# Patient Record
Sex: FEMALE | Race: BLACK OR AFRICAN AMERICAN | NOT HISPANIC OR LATINO | Employment: FULL TIME | URBAN - NONMETROPOLITAN AREA
[De-identification: names, ages, dates, MRNs, and addresses within clinical notes are randomized per-mention and may not be internally consistent; named-entity substitution may affect disease eponyms.]

---

## 2022-08-16 ENCOUNTER — HOSPITAL ENCOUNTER (EMERGENCY)
Facility: HOSPITAL | Age: 22
Discharge: HOME | End: 2022-08-16
Attending: STUDENT IN AN ORGANIZED HEALTH CARE EDUCATION/TRAINING PROGRAM | Admitting: STUDENT IN AN ORGANIZED HEALTH CARE EDUCATION/TRAINING PROGRAM

## 2022-08-16 ENCOUNTER — APPOINTMENT (OUTPATIENT)
Dept: CT IMAGING | Facility: HOSPITAL | Age: 22
End: 2022-08-16

## 2022-08-16 VITALS
RESPIRATION RATE: 18 BRPM | HEIGHT: 61 IN | WEIGHT: 172 LBS | BODY MASS INDEX: 32.47 KG/M2 | SYSTOLIC BLOOD PRESSURE: 111 MMHG | DIASTOLIC BLOOD PRESSURE: 64 MMHG | OXYGEN SATURATION: 96 % | TEMPERATURE: 98.7 F | HEART RATE: 84 BPM

## 2022-08-16 DIAGNOSIS — K37 APPENDICITIS, UNSPECIFIED APPENDICITIS TYPE: ICD-10-CM

## 2022-08-16 DIAGNOSIS — E83.42 HYPOMAGNESEMIA: Primary | ICD-10-CM

## 2022-08-16 DIAGNOSIS — R11.2 NAUSEA AND VOMITING, UNSPECIFIED VOMITING TYPE: ICD-10-CM

## 2022-08-16 LAB
ALBUMIN SERPL-MCNC: 4.1 G/DL (ref 3.5–5.2)
ALBUMIN/GLOB SERPL: 1.3 G/DL
ALP SERPL-CCNC: 50 U/L (ref 39–117)
ALT SERPL W P-5'-P-CCNC: 9 U/L (ref 1–33)
ANION GAP SERPL CALCULATED.3IONS-SCNC: 12 MMOL/L (ref 5–15)
AST SERPL-CCNC: 24 U/L (ref 1–32)
BACTERIA UR QL AUTO: ABNORMAL /HPF
BASOPHILS # BLD AUTO: 0.02 10*3/MM3 (ref 0–0.2)
BASOPHILS NFR BLD AUTO: 0.3 % (ref 0–1.5)
BILIRUB SERPL-MCNC: 0.5 MG/DL (ref 0–1.2)
BILIRUB UR QL STRIP: NEGATIVE
BUN SERPL-MCNC: 11 MG/DL (ref 6–20)
BUN/CREAT SERPL: 17.5 (ref 7–25)
CALCIUM SPEC-SCNC: 9 MG/DL (ref 8.6–10.5)
CHLORIDE SERPL-SCNC: 102 MMOL/L (ref 98–107)
CLARITY UR: ABNORMAL
CO2 SERPL-SCNC: 26 MMOL/L (ref 22–29)
COLOR UR: YELLOW
CREAT SERPL-MCNC: 0.63 MG/DL (ref 0.57–1)
DEPRECATED RDW RBC AUTO: 42.1 FL (ref 37–54)
EGFRCR SERPLBLD CKD-EPI 2021: 129.6 ML/MIN/1.73
EOSINOPHIL # BLD AUTO: 0.01 10*3/MM3 (ref 0–0.4)
EOSINOPHIL NFR BLD AUTO: 0.1 % (ref 0.3–6.2)
ERYTHROCYTE [DISTWIDTH] IN BLOOD BY AUTOMATED COUNT: 13.6 % (ref 12.3–15.4)
GLOBULIN UR ELPH-MCNC: 3.1 GM/DL
GLUCOSE SERPL-MCNC: 108 MG/DL (ref 65–99)
GLUCOSE UR STRIP-MCNC: NEGATIVE MG/DL
HCG INTACT+B SERPL-ACNC: <0.1 MIU/ML
HCT VFR BLD AUTO: 39.4 % (ref 34–46.6)
HGB BLD-MCNC: 12.9 G/DL (ref 12–15.9)
HGB UR QL STRIP.AUTO: NEGATIVE
HOLD SPECIMEN: NORMAL
HOLD SPECIMEN: NORMAL
HYALINE CASTS UR QL AUTO: ABNORMAL /LPF
IMM GRANULOCYTES # BLD AUTO: 0.04 10*3/MM3 (ref 0–0.05)
IMM GRANULOCYTES NFR BLD AUTO: 0.5 % (ref 0–0.5)
KETONES UR QL STRIP: ABNORMAL
LEUKOCYTE ESTERASE UR QL STRIP.AUTO: ABNORMAL
LIPASE SERPL-CCNC: 21 U/L (ref 13–60)
LYMPHOCYTES # BLD AUTO: 1.03 10*3/MM3 (ref 0.7–3.1)
LYMPHOCYTES NFR BLD AUTO: 13.4 % (ref 19.6–45.3)
MAGNESIUM SERPL-MCNC: 1.5 MG/DL (ref 1.6–2.6)
MCH RBC QN AUTO: 27.4 PG (ref 26.6–33)
MCHC RBC AUTO-ENTMCNC: 32.7 G/DL (ref 31.5–35.7)
MCV RBC AUTO: 83.7 FL (ref 79–97)
MONOCYTES # BLD AUTO: 0.37 10*3/MM3 (ref 0.1–0.9)
MONOCYTES NFR BLD AUTO: 4.8 % (ref 5–12)
NEUTROPHILS NFR BLD AUTO: 6.24 10*3/MM3 (ref 1.7–7)
NEUTROPHILS NFR BLD AUTO: 80.9 % (ref 42.7–76)
NITRITE UR QL STRIP: NEGATIVE
NRBC BLD AUTO-RTO: 0 /100 WBC (ref 0–0.2)
PH UR STRIP.AUTO: 6.5 [PH] (ref 5–9)
PLATELET # BLD AUTO: 185 10*3/MM3 (ref 140–450)
PMV BLD AUTO: 10.1 FL (ref 6–12)
POTASSIUM SERPL-SCNC: 3.6 MMOL/L (ref 3.5–5.2)
PROT SERPL-MCNC: 7.2 G/DL (ref 6–8.5)
PROT UR QL STRIP: ABNORMAL
RBC # BLD AUTO: 4.71 10*6/MM3 (ref 3.77–5.28)
RBC # UR STRIP: ABNORMAL /HPF
REF LAB TEST METHOD: ABNORMAL
SODIUM SERPL-SCNC: 140 MMOL/L (ref 136–145)
SP GR UR STRIP: 1.03 (ref 1–1.03)
SQUAMOUS #/AREA URNS HPF: ABNORMAL /HPF
UROBILINOGEN UR QL STRIP: ABNORMAL
WBC # UR STRIP: ABNORMAL /HPF
WBC NRBC COR # BLD: 7.71 10*3/MM3 (ref 3.4–10.8)
WHOLE BLOOD HOLD COAG: NORMAL
WHOLE BLOOD HOLD SPECIMEN: NORMAL

## 2022-08-16 PROCEDURE — 25010000002 ONDANSETRON PER 1 MG: Performed by: STUDENT IN AN ORGANIZED HEALTH CARE EDUCATION/TRAINING PROGRAM

## 2022-08-16 PROCEDURE — 85025 COMPLETE CBC W/AUTO DIFF WBC: CPT | Performed by: STUDENT IN AN ORGANIZED HEALTH CARE EDUCATION/TRAINING PROGRAM

## 2022-08-16 PROCEDURE — 25010000002 IOPAMIDOL 61 % SOLUTION: Performed by: STUDENT IN AN ORGANIZED HEALTH CARE EDUCATION/TRAINING PROGRAM

## 2022-08-16 PROCEDURE — 25010000002 MORPHINE PER 10 MG: Performed by: STUDENT IN AN ORGANIZED HEALTH CARE EDUCATION/TRAINING PROGRAM

## 2022-08-16 PROCEDURE — 84702 CHORIONIC GONADOTROPIN TEST: CPT | Performed by: STUDENT IN AN ORGANIZED HEALTH CARE EDUCATION/TRAINING PROGRAM

## 2022-08-16 PROCEDURE — 83735 ASSAY OF MAGNESIUM: CPT | Performed by: STUDENT IN AN ORGANIZED HEALTH CARE EDUCATION/TRAINING PROGRAM

## 2022-08-16 PROCEDURE — 25010000002 MAGNESIUM SULFATE 2 GM/50ML SOLUTION: Performed by: STUDENT IN AN ORGANIZED HEALTH CARE EDUCATION/TRAINING PROGRAM

## 2022-08-16 PROCEDURE — 74177 CT ABD & PELVIS W/CONTRAST: CPT

## 2022-08-16 PROCEDURE — 99284 EMERGENCY DEPT VISIT MOD MDM: CPT

## 2022-08-16 PROCEDURE — 96365 THER/PROPH/DIAG IV INF INIT: CPT

## 2022-08-16 PROCEDURE — 81001 URINALYSIS AUTO W/SCOPE: CPT | Performed by: STUDENT IN AN ORGANIZED HEALTH CARE EDUCATION/TRAINING PROGRAM

## 2022-08-16 PROCEDURE — 96375 TX/PRO/DX INJ NEW DRUG ADDON: CPT

## 2022-08-16 PROCEDURE — 80053 COMPREHEN METABOLIC PANEL: CPT | Performed by: STUDENT IN AN ORGANIZED HEALTH CARE EDUCATION/TRAINING PROGRAM

## 2022-08-16 PROCEDURE — 83690 ASSAY OF LIPASE: CPT | Performed by: STUDENT IN AN ORGANIZED HEALTH CARE EDUCATION/TRAINING PROGRAM

## 2022-08-16 RX ORDER — MOXIFLOXACIN HYDROCHLORIDE 400 MG/1
400 TABLET ORAL DAILY
Qty: 7 TABLET | Refills: 0 | Status: SHIPPED | OUTPATIENT
Start: 2022-08-16 | End: 2022-08-23

## 2022-08-16 RX ORDER — ONDANSETRON 4 MG/1
4 TABLET, ORALLY DISINTEGRATING ORAL 4 TIMES DAILY PRN
Qty: 12 TABLET | Refills: 0 | Status: SHIPPED | OUTPATIENT
Start: 2022-08-16 | End: 2022-12-14

## 2022-08-16 RX ORDER — LEVOFLOXACIN 750 MG/1
750 TABLET ORAL ONCE
Status: COMPLETED | OUTPATIENT
Start: 2022-08-16 | End: 2022-08-16

## 2022-08-16 RX ORDER — MAGNESIUM SULFATE HEPTAHYDRATE 40 MG/ML
2 INJECTION, SOLUTION INTRAVENOUS ONCE
Status: COMPLETED | OUTPATIENT
Start: 2022-08-16 | End: 2022-08-16

## 2022-08-16 RX ORDER — SODIUM CHLORIDE 0.9 % (FLUSH) 0.9 %
10 SYRINGE (ML) INJECTION AS NEEDED
Status: DISCONTINUED | OUTPATIENT
Start: 2022-08-16 | End: 2022-08-17 | Stop reason: HOSPADM

## 2022-08-16 RX ORDER — ONDANSETRON 2 MG/ML
4 INJECTION INTRAMUSCULAR; INTRAVENOUS ONCE
Status: COMPLETED | OUTPATIENT
Start: 2022-08-16 | End: 2022-08-16

## 2022-08-16 RX ADMIN — ONDANSETRON 4 MG: 2 INJECTION INTRAMUSCULAR; INTRAVENOUS at 19:48

## 2022-08-16 RX ADMIN — MAGNESIUM SULFATE HEPTAHYDRATE 2 G: 40 INJECTION, SOLUTION INTRAVENOUS at 20:38

## 2022-08-16 RX ADMIN — SODIUM CHLORIDE, POTASSIUM CHLORIDE, SODIUM LACTATE AND CALCIUM CHLORIDE 1000 ML: 600; 310; 30; 20 INJECTION, SOLUTION INTRAVENOUS at 19:46

## 2022-08-16 RX ADMIN — IOPAMIDOL 90 ML: 612 INJECTION, SOLUTION INTRAVENOUS at 20:53

## 2022-08-16 RX ADMIN — LEVOFLOXACIN 750 MG: 750 TABLET, FILM COATED ORAL at 22:19

## 2022-08-16 RX ADMIN — MORPHINE SULFATE 4 MG: 4 INJECTION INTRAVENOUS at 19:48

## 2022-08-17 ENCOUNTER — ANESTHESIA (OUTPATIENT)
Dept: PERIOP | Facility: HOSPITAL | Age: 22
End: 2022-08-17

## 2022-08-17 ENCOUNTER — OFFICE VISIT (OUTPATIENT)
Dept: SURGERY | Facility: CLINIC | Age: 22
End: 2022-08-17

## 2022-08-17 ENCOUNTER — HOSPITAL ENCOUNTER (OUTPATIENT)
Facility: HOSPITAL | Age: 22
Discharge: HOME | End: 2022-08-18
Attending: STUDENT IN AN ORGANIZED HEALTH CARE EDUCATION/TRAINING PROGRAM | Admitting: STUDENT IN AN ORGANIZED HEALTH CARE EDUCATION/TRAINING PROGRAM

## 2022-08-17 ENCOUNTER — ANESTHESIA EVENT (OUTPATIENT)
Dept: PERIOP | Facility: HOSPITAL | Age: 22
End: 2022-08-17

## 2022-08-17 VITALS
OXYGEN SATURATION: 97 % | WEIGHT: 170.2 LBS | BODY MASS INDEX: 32.13 KG/M2 | HEIGHT: 61 IN | SYSTOLIC BLOOD PRESSURE: 120 MMHG | HEART RATE: 94 BPM | TEMPERATURE: 97.1 F | DIASTOLIC BLOOD PRESSURE: 84 MMHG

## 2022-08-17 DIAGNOSIS — K35.20 ACUTE APPENDICITIS WITH GENERALIZED PERITONITIS, WITHOUT GANGRENE OR ABSCESS, UNSPECIFIED WHETHER PERFORATION PRESENT: Primary | ICD-10-CM

## 2022-08-17 DIAGNOSIS — R10.13 EPIGASTRIC PAIN: Primary | ICD-10-CM

## 2022-08-17 DIAGNOSIS — R10.13 EPIGASTRIC PAIN: ICD-10-CM

## 2022-08-17 PROBLEM — K37 APPENDICITIS: Status: ACTIVE | Noted: 2022-08-17

## 2022-08-17 LAB
AMPHET+METHAMPHET UR QL: NEGATIVE
AMPHETAMINES UR QL: NEGATIVE
B-HCG UR QL: NEGATIVE
BARBITURATES UR QL SCN: NEGATIVE
BENZODIAZ UR QL SCN: NEGATIVE
BUPRENORPHINE SERPL-MCNC: NEGATIVE NG/ML
CANNABINOIDS SERPL QL: POSITIVE
COCAINE UR QL: NEGATIVE
FLUAV RNA RESP QL NAA+PROBE: NOT DETECTED
FLUBV RNA RESP QL NAA+PROBE: NOT DETECTED
METHADONE UR QL SCN: NEGATIVE
OPIATES UR QL: POSITIVE
OXYCODONE UR QL SCN: NEGATIVE
PCP UR QL SCN: NEGATIVE
PROPOXYPH UR QL: NEGATIVE
SARS-COV-2 RNA RESP QL NAA+PROBE: DETECTED
TRICYCLICS UR QL SCN: NEGATIVE

## 2022-08-17 PROCEDURE — 25010000002 DEXAMETHASONE PER 1 MG: Performed by: NURSE ANESTHETIST, CERTIFIED REGISTERED

## 2022-08-17 PROCEDURE — G0378 HOSPITAL OBSERVATION PER HR: HCPCS

## 2022-08-17 PROCEDURE — 44970 LAPAROSCOPY APPENDECTOMY: CPT | Performed by: STUDENT IN AN ORGANIZED HEALTH CARE EDUCATION/TRAINING PROGRAM

## 2022-08-17 PROCEDURE — 25010000002 HYDROMORPHONE 1 MG/ML SOLUTION: Performed by: NURSE ANESTHETIST, CERTIFIED REGISTERED

## 2022-08-17 PROCEDURE — 99204 OFFICE O/P NEW MOD 45 MIN: CPT | Performed by: STUDENT IN AN ORGANIZED HEALTH CARE EDUCATION/TRAINING PROGRAM

## 2022-08-17 PROCEDURE — 87636 SARSCOV2 & INF A&B AMP PRB: CPT | Performed by: STUDENT IN AN ORGANIZED HEALTH CARE EDUCATION/TRAINING PROGRAM

## 2022-08-17 PROCEDURE — 25010000002 KETOROLAC TROMETHAMINE PER 15 MG: Performed by: STUDENT IN AN ORGANIZED HEALTH CARE EDUCATION/TRAINING PROGRAM

## 2022-08-17 PROCEDURE — 81025 URINE PREGNANCY TEST: CPT | Performed by: ANESTHESIOLOGY

## 2022-08-17 PROCEDURE — 25010000002 FENTANYL CITRATE (PF) 50 MCG/ML SOLUTION: Performed by: NURSE ANESTHETIST, CERTIFIED REGISTERED

## 2022-08-17 PROCEDURE — 25010000002 PROPOFOL 10 MG/ML EMULSION: Performed by: NURSE ANESTHETIST, CERTIFIED REGISTERED

## 2022-08-17 PROCEDURE — 80306 DRUG TEST PRSMV INSTRMNT: CPT | Performed by: ANESTHESIOLOGY

## 2022-08-17 PROCEDURE — 25010000002 NEOSTIGMINE 10 MG/10ML SOLUTION: Performed by: NURSE ANESTHETIST, CERTIFIED REGISTERED

## 2022-08-17 PROCEDURE — 25010000002 ONDANSETRON PER 1 MG: Performed by: NURSE ANESTHETIST, CERTIFIED REGISTERED

## 2022-08-17 PROCEDURE — 25010000002 MIDAZOLAM PER 1 MG: Performed by: NURSE ANESTHETIST, CERTIFIED REGISTERED

## 2022-08-17 PROCEDURE — 25010000002 SUCCINYLCHOLINE PER 20 MG: Performed by: NURSE ANESTHETIST, CERTIFIED REGISTERED

## 2022-08-17 PROCEDURE — C9803 HOPD COVID-19 SPEC COLLECT: HCPCS | Performed by: STUDENT IN AN ORGANIZED HEALTH CARE EDUCATION/TRAINING PROGRAM

## 2022-08-17 DEVICE — ENDOPATH ECHELON VASCULAR  RELOADS, WHITE, 35MM
Type: IMPLANTABLE DEVICE | Site: ABDOMEN | Status: FUNCTIONAL
Brand: ECHELON ENDOPATH

## 2022-08-17 RX ORDER — PROPOFOL 10 MG/ML
VIAL (ML) INTRAVENOUS AS NEEDED
Status: DISCONTINUED | OUTPATIENT
Start: 2022-08-17 | End: 2022-08-17 | Stop reason: SURG

## 2022-08-17 RX ORDER — DEXAMETHASONE SODIUM PHOSPHATE 4 MG/ML
INJECTION, SOLUTION INTRA-ARTICULAR; INTRALESIONAL; INTRAMUSCULAR; INTRAVENOUS; SOFT TISSUE AS NEEDED
Status: DISCONTINUED | OUTPATIENT
Start: 2022-08-17 | End: 2022-08-17 | Stop reason: SURG

## 2022-08-17 RX ORDER — MEPERIDINE HYDROCHLORIDE 25 MG/ML
12.5 INJECTION INTRAMUSCULAR; INTRAVENOUS; SUBCUTANEOUS
Status: DISCONTINUED | OUTPATIENT
Start: 2022-08-17 | End: 2022-08-17 | Stop reason: HOSPADM

## 2022-08-17 RX ORDER — NEOSTIGMINE METHYLSULFATE 1 MG/ML
INJECTION, SOLUTION INTRAVENOUS AS NEEDED
Status: DISCONTINUED | OUTPATIENT
Start: 2022-08-17 | End: 2022-08-17 | Stop reason: SURG

## 2022-08-17 RX ORDER — PROMETHAZINE HYDROCHLORIDE 25 MG/1
25 SUPPOSITORY RECTAL ONCE AS NEEDED
Status: DISCONTINUED | OUTPATIENT
Start: 2022-08-17 | End: 2022-08-17 | Stop reason: HOSPADM

## 2022-08-17 RX ORDER — BUPIVACAINE HYDROCHLORIDE 2.5 MG/ML
INJECTION, SOLUTION EPIDURAL; INFILTRATION; INTRACAUDAL AS NEEDED
Status: DISCONTINUED | OUTPATIENT
Start: 2022-08-17 | End: 2022-08-17 | Stop reason: HOSPADM

## 2022-08-17 RX ORDER — IBUPROFEN 400 MG/1
400 TABLET ORAL
Status: DISCONTINUED | OUTPATIENT
Start: 2022-08-17 | End: 2022-08-18 | Stop reason: HOSPADM

## 2022-08-17 RX ORDER — ONDANSETRON 2 MG/ML
4 INJECTION INTRAMUSCULAR; INTRAVENOUS ONCE AS NEEDED
Status: DISCONTINUED | OUTPATIENT
Start: 2022-08-17 | End: 2022-08-17 | Stop reason: HOSPADM

## 2022-08-17 RX ORDER — SUCCINYLCHOLINE CHLORIDE 20 MG/ML
INJECTION INTRAMUSCULAR; INTRAVENOUS AS NEEDED
Status: DISCONTINUED | OUTPATIENT
Start: 2022-08-17 | End: 2022-08-17 | Stop reason: SURG

## 2022-08-17 RX ORDER — ONDANSETRON 2 MG/ML
INJECTION INTRAMUSCULAR; INTRAVENOUS AS NEEDED
Status: DISCONTINUED | OUTPATIENT
Start: 2022-08-17 | End: 2022-08-17 | Stop reason: SURG

## 2022-08-17 RX ORDER — MIDAZOLAM HYDROCHLORIDE 1 MG/ML
INJECTION INTRAMUSCULAR; INTRAVENOUS AS NEEDED
Status: DISCONTINUED | OUTPATIENT
Start: 2022-08-17 | End: 2022-08-17 | Stop reason: SURG

## 2022-08-17 RX ORDER — CLINDAMYCIN PHOSPHATE 900 MG/50ML
900 INJECTION INTRAVENOUS ONCE
Status: DISCONTINUED | OUTPATIENT
Start: 2022-08-17 | End: 2022-08-17 | Stop reason: HOSPADM

## 2022-08-17 RX ORDER — HYDROCODONE BITARTRATE AND ACETAMINOPHEN 5; 325 MG/1; MG/1
1 TABLET ORAL EVERY 4 HOURS PRN
Status: DISCONTINUED | OUTPATIENT
Start: 2022-08-17 | End: 2022-08-18 | Stop reason: HOSPADM

## 2022-08-17 RX ORDER — KETOROLAC TROMETHAMINE 30 MG/ML
30 INJECTION, SOLUTION INTRAMUSCULAR; INTRAVENOUS EVERY 6 HOURS PRN
Status: DISCONTINUED | OUTPATIENT
Start: 2022-08-17 | End: 2022-08-18 | Stop reason: HOSPADM

## 2022-08-17 RX ORDER — FAMOTIDINE 10 MG/ML
20 INJECTION, SOLUTION INTRAVENOUS 2 TIMES DAILY
Status: DISCONTINUED | OUTPATIENT
Start: 2022-08-17 | End: 2022-08-18 | Stop reason: HOSPADM

## 2022-08-17 RX ORDER — SODIUM CHLORIDE, SODIUM GLUCONATE, SODIUM ACETATE, POTASSIUM CHLORIDE AND MAGNESIUM CHLORIDE 526; 502; 368; 37; 30 MG/100ML; MG/100ML; MG/100ML; MG/100ML; MG/100ML
1000 INJECTION, SOLUTION INTRAVENOUS CONTINUOUS PRN
Status: DISCONTINUED | OUTPATIENT
Start: 2022-08-17 | End: 2022-08-18 | Stop reason: HOSPADM

## 2022-08-17 RX ORDER — ONDANSETRON 2 MG/ML
4 INJECTION INTRAMUSCULAR; INTRAVENOUS EVERY 6 HOURS PRN
Status: DISCONTINUED | OUTPATIENT
Start: 2022-08-17 | End: 2022-08-18 | Stop reason: HOSPADM

## 2022-08-17 RX ORDER — ONDANSETRON 4 MG/1
4 TABLET, ORALLY DISINTEGRATING ORAL 4 TIMES DAILY PRN
Status: DISCONTINUED | OUTPATIENT
Start: 2022-08-17 | End: 2022-08-17 | Stop reason: SDUPTHER

## 2022-08-17 RX ORDER — FENTANYL CITRATE 50 UG/ML
INJECTION, SOLUTION INTRAMUSCULAR; INTRAVENOUS AS NEEDED
Status: DISCONTINUED | OUTPATIENT
Start: 2022-08-17 | End: 2022-08-17 | Stop reason: SURG

## 2022-08-17 RX ORDER — PROMETHAZINE HYDROCHLORIDE 25 MG/1
25 TABLET ORAL ONCE AS NEEDED
Status: DISCONTINUED | OUTPATIENT
Start: 2022-08-17 | End: 2022-08-17 | Stop reason: HOSPADM

## 2022-08-17 RX ORDER — SODIUM CHLORIDE 0.9 % (FLUSH) 0.9 %
10 SYRINGE (ML) INJECTION AS NEEDED
Status: DISCONTINUED | OUTPATIENT
Start: 2022-08-17 | End: 2022-08-17 | Stop reason: HOSPADM

## 2022-08-17 RX ORDER — ONDANSETRON 4 MG/1
4 TABLET, FILM COATED ORAL EVERY 6 HOURS PRN
Status: DISCONTINUED | OUTPATIENT
Start: 2022-08-17 | End: 2022-08-18 | Stop reason: HOSPADM

## 2022-08-17 RX ORDER — CYCLOBENZAPRINE HCL 5 MG
5 TABLET ORAL 3 TIMES DAILY PRN
Status: DISCONTINUED | OUTPATIENT
Start: 2022-08-17 | End: 2022-08-18 | Stop reason: HOSPADM

## 2022-08-17 RX ADMIN — SUCCINYLCHOLINE CHLORIDE 140 MG: 20 INJECTION, SOLUTION INTRAMUSCULAR; INTRAVENOUS at 18:28

## 2022-08-17 RX ADMIN — FAMOTIDINE 20 MG: 10 INJECTION INTRAVENOUS at 20:55

## 2022-08-17 RX ADMIN — MIDAZOLAM HYDROCHLORIDE 2 MG: 1 INJECTION, SOLUTION INTRAMUSCULAR; INTRAVENOUS at 18:18

## 2022-08-17 RX ADMIN — NEOSTIGMINE METHYLSULFATE 2 MG: 0.5 INJECTION INTRAVENOUS at 19:06

## 2022-08-17 RX ADMIN — HYDROMORPHONE HYDROCHLORIDE 0.5 MG: 1 INJECTION, SOLUTION INTRAMUSCULAR; INTRAVENOUS; SUBCUTANEOUS at 19:25

## 2022-08-17 RX ADMIN — SODIUM CHLORIDE, SODIUM GLUCONATE, SODIUM ACETATE, POTASSIUM CHLORIDE AND MAGNESIUM CHLORIDE 1000 ML: 526; 502; 368; 37; 30 INJECTION, SOLUTION INTRAVENOUS at 20:04

## 2022-08-17 RX ADMIN — HYDROCODONE BITARTRATE AND ACETAMINOPHEN 1 TABLET: 5; 325 TABLET ORAL at 23:54

## 2022-08-17 RX ADMIN — ONDANSETRON 4 MG: 2 INJECTION INTRAMUSCULAR; INTRAVENOUS at 18:43

## 2022-08-17 RX ADMIN — PROPOFOL 200 MG: 10 INJECTION, EMULSION INTRAVENOUS at 18:23

## 2022-08-17 RX ADMIN — HYDROMORPHONE HYDROCHLORIDE 0.5 MG: 1 INJECTION, SOLUTION INTRAMUSCULAR; INTRAVENOUS; SUBCUTANEOUS at 20:01

## 2022-08-17 RX ADMIN — SODIUM CHLORIDE, SODIUM GLUCONATE, SODIUM ACETATE, POTASSIUM CHLORIDE AND MAGNESIUM CHLORIDE 1000 ML: 526; 502; 368; 37; 30 INJECTION, SOLUTION INTRAVENOUS at 13:49

## 2022-08-17 RX ADMIN — IBUPROFEN 400 MG: 400 TABLET ORAL at 20:55

## 2022-08-17 RX ADMIN — KETOROLAC TROMETHAMINE 30 MG: 30 INJECTION, SOLUTION INTRAMUSCULAR; INTRAVENOUS at 20:55

## 2022-08-17 RX ADMIN — GLYCOPYRROLATE 0.4 MG: 0.2 INJECTION, SOLUTION INTRAMUSCULAR; INTRAVITREAL at 19:06

## 2022-08-17 RX ADMIN — DEXAMETHASONE SODIUM PHOSPHATE 4 MG: 4 INJECTION, SOLUTION INTRAMUSCULAR; INTRAVENOUS at 18:43

## 2022-08-17 RX ADMIN — FENTANYL CITRATE 100 MCG: 50 INJECTION INTRAMUSCULAR; INTRAVENOUS at 18:23

## 2022-08-17 NOTE — ED PROVIDER NOTES
"Subjective   21-year-old female comes to the ER chief complaint of several hour history of worsening abdominal pain, nausea, vomiting, diarrhea.  She endorses having bilateral flank pain.  Nothing is making her feel better or worse.  She rates the pain a 10/10.  It is burning and sharp in nature.  She has never had something like this before.  She has not taken something for the pain.      History provided by:  Patient   used: No        Review of Systems   Constitutional: Positive for activity change, appetite change and fatigue. Negative for chills and fever.   HENT: Negative for drooling.    Eyes: Negative for redness.   Respiratory: Negative for cough, chest tightness, shortness of breath and wheezing.    Cardiovascular: Negative for chest pain and palpitations.   Gastrointestinal: Positive for abdominal pain, diarrhea, nausea and vomiting. Negative for blood in stool and constipation.   Genitourinary: Positive for flank pain. Negative for difficulty urinating, dysuria, frequency, urgency, vaginal bleeding and vaginal discharge.   Skin: Negative for color change.   Neurological: Negative for seizures.   Psychiatric/Behavioral: Negative for confusion.       No past medical history on file.    Allergies   Allergen Reactions   • Penicillins Anaphylaxis and Unknown - High Severity       No past surgical history on file.    No family history on file.    Social History     Socioeconomic History   • Marital status:    Tobacco Use   • Smoking status: Never Smoker   • Smokeless tobacco: Never Used           Objective    Vitals:    08/16/22 1931 08/2000 08/16/22 2015 08/16/22 2030   BP: 99/55 107/62 106/61 103/59   Pulse: 85 74 70 72   Resp: 20 18 17 18   Temp:       TempSrc:       SpO2: 97% 95% 97% 95%   Weight: 78 kg (172 lb)      Height: 154.9 cm (61\")          Physical Exam  Vitals and nursing note reviewed.   Constitutional:       General: She is not in acute distress.     Appearance: " She is well-developed. She is obese. She is ill-appearing. She is not toxic-appearing or diaphoretic.   HENT:      Head: Normocephalic.      Right Ear: External ear normal.      Left Ear: External ear normal.      Mouth/Throat:      Mouth: Mucous membranes are moist.   Eyes:      Conjunctiva/sclera: Conjunctivae normal.   Pulmonary:      Effort: Pulmonary effort is normal. No accessory muscle usage or respiratory distress.      Breath sounds: No wheezing.   Chest:      Chest wall: No tenderness.   Abdominal:      General: Bowel sounds are normal.      Palpations: Abdomen is soft.      Tenderness: There is abdominal tenderness. There is right CVA tenderness and left CVA tenderness. There is no guarding or rebound.   Musculoskeletal:      Cervical back: No tenderness or bony tenderness.      Thoracic back: No tenderness or bony tenderness.      Lumbar back: No tenderness or bony tenderness.      Right lower leg: No edema.      Left lower leg: No edema.   Skin:     General: Skin is warm and dry.      Capillary Refill: Capillary refill takes less than 2 seconds.   Neurological:      Mental Status: She is alert and oriented to person, place, and time.   Psychiatric:         Behavior: Behavior normal.         Procedures           ED Course      Results for orders placed or performed during the hospital encounter of 08/16/22   hCG, Quantitative, Pregnancy    Specimen: Blood   Result Value Ref Range    HCG Quantitative <0.10 mIU/mL   Comprehensive Metabolic Panel    Specimen: Blood   Result Value Ref Range    Glucose 108 (H) 65 - 99 mg/dL    BUN 11 6 - 20 mg/dL    Creatinine 0.63 0.57 - 1.00 mg/dL    Sodium 140 136 - 145 mmol/L    Potassium 3.6 3.5 - 5.2 mmol/L    Chloride 102 98 - 107 mmol/L    CO2 26.0 22.0 - 29.0 mmol/L    Calcium 9.0 8.6 - 10.5 mg/dL    Total Protein 7.2 6.0 - 8.5 g/dL    Albumin 4.10 3.50 - 5.20 g/dL    ALT (SGPT) 9 1 - 33 U/L    AST (SGOT) 24 1 - 32 U/L    Alkaline Phosphatase 50 39 - 117 U/L    Total  Bilirubin 0.5 0.0 - 1.2 mg/dL    Globulin 3.1 gm/dL    A/G Ratio 1.3 g/dL    BUN/Creatinine Ratio 17.5 7.0 - 25.0    Anion Gap 12.0 5.0 - 15.0 mmol/L    eGFR 129.6 >60.0 mL/min/1.73   Lipase    Specimen: Blood   Result Value Ref Range    Lipase 21 13 - 60 U/L   Urinalysis With Microscopic If Indicated (No Culture) - Urine, Clean Catch    Specimen: Urine, Clean Catch   Result Value Ref Range    Color, UA Yellow Yellow, Straw, Dark Yellow, Yudith    Appearance, UA Cloudy (A) Clear    pH, UA 6.5 5.0 - 9.0    Specific Bostwick, UA 1.026 1.003 - 1.030    Glucose, UA Negative Negative    Ketones, UA 80 mg/dL (3+) (A) Negative    Bilirubin, UA Negative Negative    Blood, UA Negative Negative    Protein, UA Trace (A) Negative    Leuk Esterase, UA Trace (A) Negative    Nitrite, UA Negative Negative    Urobilinogen, UA 1.0 E.U./dL 0.2 - 1.0 E.U./dL   CBC Auto Differential    Specimen: Blood   Result Value Ref Range    WBC 7.71 3.40 - 10.80 10*3/mm3    RBC 4.71 3.77 - 5.28 10*6/mm3    Hemoglobin 12.9 12.0 - 15.9 g/dL    Hematocrit 39.4 34.0 - 46.6 %    MCV 83.7 79.0 - 97.0 fL    MCH 27.4 26.6 - 33.0 pg    MCHC 32.7 31.5 - 35.7 g/dL    RDW 13.6 12.3 - 15.4 %    RDW-SD 42.1 37.0 - 54.0 fl    MPV 10.1 6.0 - 12.0 fL    Platelets 185 140 - 450 10*3/mm3    Neutrophil % 80.9 (H) 42.7 - 76.0 %    Lymphocyte % 13.4 (L) 19.6 - 45.3 %    Monocyte % 4.8 (L) 5.0 - 12.0 %    Eosinophil % 0.1 (L) 0.3 - 6.2 %    Basophil % 0.3 0.0 - 1.5 %    Immature Grans % 0.5 0.0 - 0.5 %    Neutrophils, Absolute 6.24 1.70 - 7.00 10*3/mm3    Lymphocytes, Absolute 1.03 0.70 - 3.10 10*3/mm3    Monocytes, Absolute 0.37 0.10 - 0.90 10*3/mm3    Eosinophils, Absolute 0.01 0.00 - 0.40 10*3/mm3    Basophils, Absolute 0.02 0.00 - 0.20 10*3/mm3    Immature Grans, Absolute 0.04 0.00 - 0.05 10*3/mm3    nRBC 0.0 0.0 - 0.2 /100 WBC   Magnesium    Specimen: Blood   Result Value Ref Range    Magnesium 1.5 (L) 1.6 - 2.6 mg/dL   Urinalysis, Microscopic Only - Urine, Clean  Catch    Specimen: Urine, Clean Catch   Result Value Ref Range    RBC, UA None Seen None Seen /HPF    WBC, UA 13-20 (A) None Seen, 0-2, 3-5 /HPF    Bacteria, UA 3+ (A) None Seen /HPF    Squamous Epithelial Cells, UA 13-20 (A) None Seen, 0-2 /HPF    Hyaline Casts, UA None Seen None Seen /LPF    Methodology Manual Light Microscopy    Green Top (Gel)   Result Value Ref Range    Extra Tube Hold for add-ons.    Lavender Top   Result Value Ref Range    Extra Tube hold for add-on    Gold Top - SST   Result Value Ref Range    Extra Tube Hold for add-ons.    Light Blue Top   Result Value Ref Range    Extra Tube Hold for add-ons.            CT Abdomen Pelvis With Contrast   Final Result   1.  Early appendicitis possible. There is a borderline appendix   with minimal enhancement suspected. Correlate clinically.   2.  No obstruction or herniated bowel loops.      Electronically signed by:  Lencho Celis MD  8/16/2022 9:17 PM CDT   Workstation: [id number]                                          MDM  Number of Diagnoses or Management Options  Appendicitis, unspecified appendicitis type: new and requires workup  Hypomagnesemia: new and requires workup  Nausea and vomiting, unspecified vomiting type: new and requires workup  Diagnosis management comments: Vital signs are stable, afebrile.  Labs significant for 3+ ketones, glucose 108, UA is contaminated.  Magnesium is 1.5.  CT abdomen pelvis shows possible early appendicitis.  Patient received IVF bolus, pain medicine, magnesium, antibiotic.  Spoke with general surgery who recommends oral antibiotics and outpatient follow-up if the patient is able to tolerate p.o.  No episodes of vomiting in the ER with the patient.  Recommend PCP and general surgery follow-up.  Patient states understanding and is agreeable to the plan.  We will call in an antibiotic.      Final diagnoses:   Hypomagnesemia   Nausea and vomiting, unspecified vomiting type   Appendicitis, unspecified appendicitis type        ED Disposition  ED Disposition     ED Disposition   Discharge    Condition   Stable    Comment   --             [facility]  [address]   [place] [id number] [phone]  Schedule an appointment as soon as possible for a visit in 2 days  ER follow up    Dejuan Crawford MD  [address] [place] [address] [address]  [place] [id number] [phone]    Call in 1 day  ER follow up         Medication List      New Prescriptions    moxifloxacin 400 MG tablet  Commonly known as: Avelox  Take 1 tablet by mouth Daily for 7 days.     ondansetron ODT 4 MG disintegrating tablet  Commonly known as: ZOFRAN-ODT  Place 1 tablet on the tongue 4 (Four) Times a Day As Needed for Nausea or Vomiting.           Where to Get Your Medications      These medications were sent to [facility]/pharmacy #[id number] - [place], [place] - [address] [place] [address] - [phone]  - [phone] [address], [facility] [address]    Phone: [phone]   · moxifloxacin 400 MG tablet  · ondansetron ODT 4 MG disintegrating tablet          Aston Anderson MD  08/16/22 6040

## 2022-08-17 NOTE — ANESTHESIA PROCEDURE NOTES
Airway  Urgency: elective    Date/Time: 8/17/2022 6:32 PM  Airway not difficult    General Information and Staff    Patient location during procedure: OR  CRNA/CAA: Julianna Evans CRNA    Indications and Patient Condition    Preoxygenated: yes  MILS maintained throughout  Mask difficulty assessment: 1 - vent by mask    Final Airway Details  Final airway type: endotracheal airway      Successful airway: ETT  Cuffed: yes   Successful intubation technique: video laryngoscopy  Facilitating devices/methods: intubating stylet  Endotracheal tube insertion site: oral  Blade: Greene  Blade size: 3  ETT size (mm): 7.0  Cormack-Lehane Classification: grade I - full view of glottis  Placement verified by: chest auscultation   Measured from: lips  Number of attempts at approach: 1  Assessment: lips, teeth, and gum same as pre-op and atraumatic intubation

## 2022-08-17 NOTE — ANESTHESIA PREPROCEDURE EVALUATION
Anesthesia Evaluation     no history of anesthetic complications:  NPO Solid Status: > 8 hours  NPO Liquid Status: > 8 hours           Airway   Mallampati: I  TM distance: >3 FB  Neck ROM: full  No difficulty expected  Dental - normal exam     Pulmonary - normal exam    breath sounds clear to auscultation  (+) a smoker (quit 3 months ago) Former,   (-) COPD, asthma, sleep apnea    ROS comment: Snores occ  Cardiovascular - normal exam  Exercise tolerance: good (4-7 METS)    Rhythm: regular  Rate: normal    (-) hypertension, valvular problems/murmurs, dysrhythmias, angina, cardiac stents, DVT, hyperlipidemia      Neuro/Psych  (-) seizures, TIA, CVA, headaches, weakness, numbness, psychiatric history  GI/Hepatic/Renal/Endo    (+) obesity,     (-) GERD, hepatitis, liver disease, no renal disease, diabetes, no thyroid disorder    Musculoskeletal     (+) back pain,   Abdominal    Substance History   (+) alcohol use (occ), drug use (marijuana)     OB/GYN    (-)  Pregnant        Other        (-) history of cancer  ROS/Med Hx Other: Pain 6/10  Nausea without vomiting                  Anesthesia Plan    ASA 2 - emergent     general   Rapid sequence  intravenous induction     Anesthetic plan, risks, benefits, and alternatives have been provided, discussed and informed consent has been obtained with: patient.        CODE STATUS:

## 2022-08-17 NOTE — H&P (VIEW-ONLY)
Patient Care Team:  Provider, No Known as PCP - General      Subjective .     History of present illness:  22 y/o woman with abdominal pain that become much worse yesterday. She states that she has been having back pain for approximately two weeks but that her abdomen began hurting last night with some associated nausea and vomiting. She denies any fever or chills. She had a CT scan which showed possibly eraly acute appendicitis. She says her pain is now a 10/10.    Review of Systems   Constitutional: Negative.    HENT: Negative.    Eyes: Negative.    Respiratory: Negative.    Cardiovascular: Negative.    Gastrointestinal: Positive for abdominal pain.   Endocrine: Negative.    Genitourinary: Negative.    Musculoskeletal: Positive for back pain.   Skin: Negative.    Allergic/Immunologic: Negative.    Neurological: Negative.    Hematological: Negative.          History  No past medical history on file., History reviewed. No pertinent surgical history., History reviewed. No pertinent family history.,   Social History     Tobacco Use   • Smoking status: Never Smoker   • Smokeless tobacco: Never Used   Substance Use Topics   • Alcohol use: Yes     Comment: occasionally   • Drug use: Never   , Home Medications:  Prior to Admission medications    Medication Sig Start Date End Date Taking? Authorizing Provider   cyclobenzaprine (FLEXERIL) 5 MG tablet Take 1 tablet by mouth 3 (Three) Times a Day As Needed for Muscle Spasms. 8/14/22  Yes Yun Morales APRN   diclofenac (VOLTAREN) 50 MG EC tablet Take 1 tablet by mouth 2 (Two) Times a Day As Needed (back and neck pain). 8/14/22  Yes Yun Morales APRN   moxifloxacin (Avelox) 400 MG tablet Take 1 tablet by mouth Daily for 7 days. 8/16/22 8/23/22  Aston Anderson MD   ondansetron ODT (ZOFRAN-ODT) 4 MG disintegrating tablet Place 1 tablet on the tongue 4 (Four) Times a Day As Needed for Nausea or Vomiting. 8/16/22   Aston Anderson MD   , Scheduled Meds:  ,  Continuous Infusions:  No current facility-administered medications for this visit.  , PRN Meds:   and Allergies:    Allergies   Allergen Reactions   • Penicillins Anaphylaxis and Unknown - High Severity       Objective     Vital Signs   Temp:  [97.1 °F (36.2 °C)-98.7 °F (37.1 °C)] 97.1 °F (36.2 °C)  Heart Rate:  [70-94] 94  Resp:  [17-20] 18  BP: ()/(55-84) 120/84    Physical Exam:  Physical Exam  Constitutional:       Appearance: Normal appearance.   HENT:      Head: Normocephalic and atraumatic.   Abdominal:      Comments: Diffusely tender, nonperitonitic   Neurological:      General: No focal deficit present.      Mental Status: She is alert and oriented to person, place, and time.   Psychiatric:         Mood and Affect: Mood normal.         Behavior: Behavior normal.           Results from last 7 days   Lab Units 08/16/22  1939   SODIUM mmol/L 140   POTASSIUM mmol/L 3.6   CHLORIDE mmol/L 102   CO2 mmol/L 26.0   BUN mg/dL 11   CREATININE mg/dL 0.63   GLUCOSE mg/dL 108*   CALCIUM mg/dL 9.0     Results from last 7 days   Lab Units 08/16/22  1939   WBC 10*3/mm3 7.71   HEMOGLOBIN g/dL 12.9   HEMATOCRIT % 39.4   PLATELETS 10*3/mm3 185       Results Review:   I reviewed the patient's new clinical results.  I reviewed the patient's new imaging results and agree with the interpretation.      Assessment & Plan     22 y/o woman with abdominal pain of unclear eitology    - Given the level of pain Ms. Phelan has today and with her history of possible early acute appendicits, I think it is reasonable to offer her a diagnostic laparoscopy and possible appendectomy.  I have counseled the patient about the nature of the problem,  the natural history of the disease,  the risk and benefits of surgery,  the expected recovery,  and the alternatives to surgery.  After this discussion  They were given an opportunity to ask questions,  have an understanding of diagnosis and treatment options, and with to proceed with surgery.   -  Will plan lap appy today      I discussed the patient's findings and my recommendations with patient and family      This document has been electronically signed by Nica Candelario MA on August 17, 2022 10:28 CDT     Nica Candelario MA  08/17/22  10:28 CDT

## 2022-08-17 NOTE — PROGRESS NOTES
Patient Care Team:  Provider, No Known as PCP - General      Subjective .     History of present illness:  20 y/o woman with abdominal pain that become much worse yesterday. She states that she has been having back pain for approximately two weeks but that her abdomen began hurting last night with some associated nausea and vomiting. She denies any fever or chills. She had a CT scan which showed possibly eraly acute appendicitis. She says her pain is now a 10/10.    Review of Systems   Constitutional: Negative.    HENT: Negative.    Eyes: Negative.    Respiratory: Negative.    Cardiovascular: Negative.    Gastrointestinal: Positive for abdominal pain.   Endocrine: Negative.    Genitourinary: Negative.    Musculoskeletal: Positive for back pain.   Skin: Negative.    Allergic/Immunologic: Negative.    Neurological: Negative.    Hematological: Negative.          History  No past medical history on file., History reviewed. No pertinent surgical history., History reviewed. No pertinent family history.,   Social History     Tobacco Use   • Smoking status: Never Smoker   • Smokeless tobacco: Never Used   Substance Use Topics   • Alcohol use: Yes     Comment: occasionally   • Drug use: Never   , Home Medications:  Prior to Admission medications    Medication Sig Start Date End Date Taking? Authorizing Provider   cyclobenzaprine (FLEXERIL) 5 MG tablet Take 1 tablet by mouth 3 (Three) Times a Day As Needed for Muscle Spasms. 8/14/22  Yes Yun Morales APRN   diclofenac (VOLTAREN) 50 MG EC tablet Take 1 tablet by mouth 2 (Two) Times a Day As Needed (back and neck pain). 8/14/22  Yes Yun Morales APRN   moxifloxacin (Avelox) 400 MG tablet Take 1 tablet by mouth Daily for 7 days. 8/16/22 8/23/22  Aston Anderson MD   ondansetron ODT (ZOFRAN-ODT) 4 MG disintegrating tablet Place 1 tablet on the tongue 4 (Four) Times a Day As Needed for Nausea or Vomiting. 8/16/22   Aston Anderson MD   , Scheduled Meds:  ,  Continuous Infusions:  No current facility-administered medications for this visit.  , PRN Meds:   and Allergies:    Allergies   Allergen Reactions   • Penicillins Anaphylaxis and Unknown - High Severity       Objective     Vital Signs   Temp:  [97.1 °F (36.2 °C)-98.7 °F (37.1 °C)] 97.1 °F (36.2 °C)  Heart Rate:  [70-94] 94  Resp:  [17-20] 18  BP: ()/(55-84) 120/84    Physical Exam:  Physical Exam  Constitutional:       Appearance: Normal appearance.   HENT:      Head: Normocephalic and atraumatic.   Abdominal:      Comments: Diffusely tender, nonperitonitic   Neurological:      General: No focal deficit present.      Mental Status: She is alert and oriented to person, place, and time.   Psychiatric:         Mood and Affect: Mood normal.         Behavior: Behavior normal.           Results from last 7 days   Lab Units 08/16/22  1939   SODIUM mmol/L 140   POTASSIUM mmol/L 3.6   CHLORIDE mmol/L 102   CO2 mmol/L 26.0   BUN mg/dL 11   CREATININE mg/dL 0.63   GLUCOSE mg/dL 108*   CALCIUM mg/dL 9.0     Results from last 7 days   Lab Units 08/16/22  1939   WBC 10*3/mm3 7.71   HEMOGLOBIN g/dL 12.9   HEMATOCRIT % 39.4   PLATELETS 10*3/mm3 185       Results Review:   I reviewed the patient's new clinical results.  I reviewed the patient's new imaging results and agree with the interpretation.      Assessment & Plan     22 y/o woman with abdominal pain of unclear eitology    - Given the level of pain Ms. Phelan has today and with her history of possible early acute appendicits, I think it is reasonable to offer her a diagnostic laparoscopy and possible appendectomy.  I have counseled the patient about the nature of the problem,  the natural history of the disease,  the risk and benefits of surgery,  the expected recovery,  and the alternatives to surgery.  After this discussion  They were given an opportunity to ask questions,  have an understanding of diagnosis and treatment options, and with to proceed with surgery.   -  Will plan lap appy today      I discussed the patient's findings and my recommendations with patient and family      This document has been electronically signed by Nica Candelario MA on August 17, 2022 10:28 CDT     Nica Candelario MA  08/17/22  10:28 CDT

## 2022-08-18 ENCOUNTER — READMISSION MANAGEMENT (OUTPATIENT)
Dept: CALL CENTER | Facility: HOSPITAL | Age: 22
End: 2022-08-18

## 2022-08-18 VITALS
HEART RATE: 58 BPM | DIASTOLIC BLOOD PRESSURE: 57 MMHG | OXYGEN SATURATION: 98 % | BODY MASS INDEX: 32.38 KG/M2 | WEIGHT: 171.5 LBS | RESPIRATION RATE: 18 BRPM | SYSTOLIC BLOOD PRESSURE: 105 MMHG | TEMPERATURE: 98 F | HEIGHT: 61 IN

## 2022-08-18 PROBLEM — R10.13 EPIGASTRIC PAIN: Status: RESOLVED | Noted: 2022-08-17 | Resolved: 2022-08-18

## 2022-08-18 PROCEDURE — G0378 HOSPITAL OBSERVATION PER HR: HCPCS

## 2022-08-18 PROCEDURE — 99024 POSTOP FOLLOW-UP VISIT: CPT | Performed by: STUDENT IN AN ORGANIZED HEALTH CARE EDUCATION/TRAINING PROGRAM

## 2022-08-18 RX ORDER — HYDROCODONE BITARTRATE AND ACETAMINOPHEN 5; 325 MG/1; MG/1
1 TABLET ORAL EVERY 6 HOURS PRN
Qty: 12 TABLET | Refills: 0 | Status: SHIPPED | OUTPATIENT
Start: 2022-08-18 | End: 2022-12-14

## 2022-08-18 RX ADMIN — IBUPROFEN 400 MG: 400 TABLET ORAL at 05:09

## 2022-08-18 RX ADMIN — IBUPROFEN 400 MG: 400 TABLET ORAL at 00:54

## 2022-08-18 RX ADMIN — IBUPROFEN 400 MG: 400 TABLET ORAL at 08:33

## 2022-08-18 RX ADMIN — FAMOTIDINE 20 MG: 10 INJECTION INTRAVENOUS at 08:34

## 2022-08-18 NOTE — ANESTHESIA POSTPROCEDURE EVALUATION
Assumed care of patient from Leatha BRUNO RN   Patient: Chandler Phelan    Procedure Summary     Date: 08/17/22 Room / Location: [facility] OR 03 /  [facility] OR    Anesthesia Start: 1820 Anesthesia Stop: 1922    Procedure: APPENDECTOMY LAPAROSCOPIC (N/A Abdomen) Diagnosis:       Epigastric pain      (Epigastric pain [R10.13])    Surgeons: Dejuan Crawford MD Provider: Julianna Evans CRNA    Anesthesia Type: general ASA Status: 2 - Emergent          Anesthesia Type: general    Vitals  No vitals data found for the desired time range.          Post Anesthesia Care and Evaluation    Patient location during evaluation: PACU  Patient participation: complete - patient participated  Level of consciousness: awake and alert  Pain score: 0  Pain management: adequate    Airway patency: patent  Anesthetic complications: No anesthetic complications  PONV Status: none  Cardiovascular status: acceptable  Respiratory status: acceptable  Hydration status: acceptable    Comments: SPO2 95%

## 2022-08-18 NOTE — DISCHARGE SUMMARY
Discharge Summary  Date: 08/18/22  Service: General Surgery  Attending: Dejuan Crawford MD    Procedures: Laparoscopic Appendectomy  Consults: none  Admitting Diagnoses: Epigastric pain [R10.13]  Appendicitis [K37]   Discharge Diagnoses: acute appendicitis  Hospital Course: 20 y/o woman with acute appendicitis who presented to my clinic. She was taken to the operating room for appendectomy. She tolerated the procedure well. She was admitted postoperatively for monitoring overnight. She tolerated a regular diet. Pain controlled with oral medications. She was discharged in good condition with outpatient follow up    Condition at time of Discharge: good  Discharge Diet: Regular      Discharge Medications:     Your medication list      START taking these medications      Instructions Last Dose Given Next Dose Due   HYDROcodone-acetaminophen 5-325 MG per tablet  Commonly known as: NORCO      Take 1 tablet by mouth Every 6 (Six) Hours As Needed (Pain) for up to 12 doses.          CONTINUE taking these medications      Instructions Last Dose Given Next Dose Due   cyclobenzaprine 5 MG tablet  Commonly known as: FLEXERIL      Take 1 tablet by mouth 3 (Three) Times a Day As Needed for Muscle Spasms.       diclofenac 50 MG EC tablet  Commonly known as: VOLTAREN      Take 1 tablet by mouth 2 (Two) Times a Day As Needed (back and neck pain).       moxifloxacin 400 MG tablet  Commonly known as: Avelox      Take 1 tablet by mouth Daily for 7 days.       ondansetron ODT 4 MG disintegrating tablet  Commonly known as: ZOFRAN-ODT      Place 1 tablet on the tongue 4 (Four) Times a Day As Needed for Nausea or Vomiting.             Where to Get Your Medications      These medications were sent to [facility]/pharmacy #[id number] - [place], [place] - [address] [place] [address] [phone] [facility] [phone] [address] [address]    Phone: [phone]   · HYDROcodone-acetaminophen 5-325 MG per tablet         Activity Instructions      Discharge Activity      1) No driving while having significant abdominal pain and no longer taking narcotics.   2) May shower in 48 hours. Do not tub bath or soak in water for two weeks  3) Do not lift / push / pull more than 10 lbs until released by Dr. Crawford            Your Scheduled Appointments    Aug 23, 2022  3:30 PM  New Patient with SAVI Arias  [facility] - [place] ([place]) [address]  [facility] [address] [place] [id number] [phone]   Bring all previous medical records and films, along with current medications and insurance information.                This document has been electronically signed by Dejuan Crawford MD on August 18, 2022 07:37 CDT

## 2022-08-18 NOTE — OP NOTE
APPENDECTOMY LAPAROSCOPIC POSSIBLE OPEN  Procedure Note    Chandler Phelan  8/17/2022    Pre-op Diagnosis:   Epigastric pain [R10.13]    Post-op Diagnosis:     Post-Op Diagnosis Codes:     * Epigastric pain [R10.13]    Procedure/CPT® Codes:      Procedure(s):  APPENDECTOMY LAPAROSCOPIC    Surgeon(s):  Dejuan Crawford MD    Anesthesia: General    Staff:   Circulator: Randi Florez RN  Scrub Person: Roc Segundo          Estimated Blood Loss: minimal    Specimens:                ID Type Source Tests Collected by Time   A (Not marked as sent) : APPENDIX  Tissue Large Intestine, Appendix TISSUE EXAM, [facility] ([facility], [facility]) Dejuan Crawford MD 8/17/2022 1901         Drains: * No LDAs found *    Findings: Acute appendicitis    Complications: none    Findings: Acute appendicitis involving primarily the tip of the appendix    Complications: None    Indications: This is a 21 year old with epigastric abdominal pain.  CT scan of the abdomen demonstrated early acute appendicitis. She was taken to the operating room for appendectomy.     Procedure description: The patient was brought to the operating room and placed supine on the operating table. Adequate general endotracheal anesthesia was induced. She urinated directly before the operation    The abdominal area was prepped and draped in a sterile manner. Timeout was performed and all parties were in agreement.    A transverse infraumbilical incision was made and carried to the umbilical stalk. This was grasped with an thi clamp. An incision was then made in the fascia. A rakel clamp was placed on each side of the fascia. A 0 vicrylstay suture was placed bilaterally. The Andre trocar was then inserted. The abdomen was insufflated to 15 mm Hg. A 5 mm laparoscope was placed and an excellent view of the abdomen was achieved. No visceral injury had occurred.   Incisions were made suprapubically and in the left lower quadrant. 5 mm trocars were uneventfully placed  under direct vision in each location with no visceral injury. The bed was tilted in Trendelenburg and tipped slightly to the left. She tolerated the positioning and insufflation without difficulty.     The appendix was acutely inflamed. No other small bowel abnormalities could be seen.  The mesoappendix  was taken down with a Harmonic scalpel.  The base of the appendix was clearly visualized at the confluence of the tenia.  An endoscopic stapler was placed across the base and fired, dividing the appendix from the cecum and placing a row of staples 2.5 mm in height.  The appendix was placed into an Endo bag and brought out through the umbilical port.  All areas were checked for bleeding and none was seen. The staple line was complete and intact.    The bed was placed supine and trocars were removed and the abdomen allowed to flatten.  The umbilical port site was closed with 0 Vicryl on the fascia, and all skin incisions were closed with a continuous 4-0 subcuticular Vicryl suture.      The procedure was terminated.  She tolerated it well.  Sponge and needle counts were correct.  She was transferred to the PACU in satisfactory condition.       This document has been electronically signed by Dejuan Crawford MD on August 17, 2022 19:18 CDT      Date: 8/17/2022  Time: 19:18 CDT

## 2022-08-19 NOTE — OUTREACH NOTE
Prep Survey    Flowsheet Row Responses   [facility] facility patient discharged from? [facility]   Is LACE score < 7 ? Yes   Emergency Room discharge w/ pulse ox? No   Eligibility Readm Mgmt   Discharge diagnosis Laparoscopic Appendectomy, COVID positive   Does the patient have one of the following disease processes/diagnoses(primary or secondary)? Other   Does the patient have Home health ordered? No   Is there a DME ordered? No   Prep survey completed? Yes          SONNY WISE - Registered Nurse

## 2022-08-23 LAB — REF LAB TEST METHOD: NORMAL

## 2022-09-01 ENCOUNTER — OFFICE VISIT (OUTPATIENT)
Dept: SURGERY | Facility: CLINIC | Age: 22
End: 2022-09-01

## 2022-09-01 VITALS
HEART RATE: 81 BPM | BODY MASS INDEX: 32.21 KG/M2 | WEIGHT: 170.6 LBS | OXYGEN SATURATION: 99 % | TEMPERATURE: 97.6 F | HEIGHT: 61 IN

## 2022-09-01 DIAGNOSIS — K35.30 ACUTE APPENDICITIS WITH LOCALIZED PERITONITIS, WITHOUT PERFORATION, ABSCESS, OR GANGRENE: Primary | ICD-10-CM

## 2022-09-01 PROCEDURE — 99024 POSTOP FOLLOW-UP VISIT: CPT | Performed by: STUDENT IN AN ORGANIZED HEALTH CARE EDUCATION/TRAINING PROGRAM

## 2022-09-01 NOTE — PROGRESS NOTES
CHIEF COMPLAINT:   Chief Complaint   Patient presents with   • Post-op     Lap Appy 8/17/22       HPI: This patient presents for a post-operative visit after undergoing a laparoscopic appendectomy.  Patient reports no problems. Eating well without any significant nausea. Having good bowel function. No problems with constipation or diarrhea. No urinary complaints. Denies fever. Ambulating well and slowly returning to normal activities.    PATHOLOGY: acute appendicitis    PHYSICAL EXAM:    ABD: Incisions are healing well without any erythema or signs of infection.    ASSESSMENT:    There are no diagnoses linked to this encounter.    PLAN:    1.The patient will follow-up on a prn basis unless there are any problems.  2. May shower.   3. May return to normal activity without restrictions.        This document has been electronically signed by Dejuan Crawford MD on September 1, 2022 09:20 CDT

## 2022-12-13 ENCOUNTER — LAB (OUTPATIENT)
Dept: LAB | Facility: HOSPITAL | Age: 22
End: 2022-12-13

## 2022-12-13 ENCOUNTER — OFFICE VISIT (OUTPATIENT)
Dept: FAMILY MEDICINE CLINIC | Facility: CLINIC | Age: 22
End: 2022-12-13

## 2022-12-13 VITALS
HEIGHT: 61 IN | BODY MASS INDEX: 34.85 KG/M2 | OXYGEN SATURATION: 99 % | DIASTOLIC BLOOD PRESSURE: 62 MMHG | WEIGHT: 184.6 LBS | RESPIRATION RATE: 22 BRPM | HEART RATE: 88 BPM | SYSTOLIC BLOOD PRESSURE: 120 MMHG

## 2022-12-13 DIAGNOSIS — Z76.89 ENCOUNTER TO ESTABLISH CARE: Primary | ICD-10-CM

## 2022-12-13 DIAGNOSIS — R19.8 ABNORMAL BOWEL MOVEMENT: ICD-10-CM

## 2022-12-13 DIAGNOSIS — Z76.89 ENCOUNTER TO ESTABLISH CARE: ICD-10-CM

## 2022-12-13 DIAGNOSIS — Z12.4 SCREENING FOR CERVICAL CANCER: ICD-10-CM

## 2022-12-13 DIAGNOSIS — K62.5 RECTAL BLEEDING: ICD-10-CM

## 2022-12-13 DIAGNOSIS — R10.32 LEFT LOWER QUADRANT ABDOMINAL PAIN: ICD-10-CM

## 2022-12-13 LAB
ALBUMIN SERPL-MCNC: 4.6 G/DL (ref 3.5–5.2)
ALBUMIN/GLOB SERPL: 1.8 G/DL
ALP SERPL-CCNC: 57 U/L (ref 39–117)
ALT SERPL W P-5'-P-CCNC: 6 U/L (ref 1–33)
ANION GAP SERPL CALCULATED.3IONS-SCNC: 5.9 MMOL/L (ref 5–15)
AST SERPL-CCNC: 15 U/L (ref 1–32)
BASOPHILS # BLD AUTO: 0.04 10*3/MM3 (ref 0–0.2)
BASOPHILS NFR BLD AUTO: 0.7 % (ref 0–1.5)
BILIRUB SERPL-MCNC: 0.2 MG/DL (ref 0–1.2)
BUN SERPL-MCNC: 12 MG/DL (ref 6–20)
BUN/CREAT SERPL: 16.4 (ref 7–25)
CALCIUM SPEC-SCNC: 9.2 MG/DL (ref 8.6–10.5)
CHLORIDE SERPL-SCNC: 104 MMOL/L (ref 98–107)
CHOLEST SERPL-MCNC: 171 MG/DL (ref 0–200)
CO2 SERPL-SCNC: 27.1 MMOL/L (ref 22–29)
CREAT SERPL-MCNC: 0.73 MG/DL (ref 0.57–1)
DEPRECATED RDW RBC AUTO: 41.3 FL (ref 37–54)
EGFRCR SERPLBLD CKD-EPI 2021: 119.4 ML/MIN/1.73
EOSINOPHIL # BLD AUTO: 0.11 10*3/MM3 (ref 0–0.4)
EOSINOPHIL NFR BLD AUTO: 1.9 % (ref 0.3–6.2)
ERYTHROCYTE [DISTWIDTH] IN BLOOD BY AUTOMATED COUNT: 13.5 % (ref 12.3–15.4)
GLOBULIN UR ELPH-MCNC: 2.5 GM/DL
GLUCOSE SERPL-MCNC: 95 MG/DL (ref 65–99)
HBA1C MFR BLD: 5 % (ref 4.8–5.6)
HCT VFR BLD AUTO: 38.5 % (ref 34–46.6)
HCV AB SER DONR QL: NORMAL
HDLC SERPL-MCNC: 60 MG/DL (ref 40–60)
HGB BLD-MCNC: 13.2 G/DL (ref 12–15.9)
IMM GRANULOCYTES # BLD AUTO: 0.01 10*3/MM3 (ref 0–0.05)
IMM GRANULOCYTES NFR BLD AUTO: 0.2 % (ref 0–0.5)
LDLC SERPL CALC-MCNC: 104 MG/DL (ref 0–100)
LDLC/HDLC SERPL: 1.74 {RATIO}
LYMPHOCYTES # BLD AUTO: 2.06 10*3/MM3 (ref 0.7–3.1)
LYMPHOCYTES NFR BLD AUTO: 35.5 % (ref 19.6–45.3)
MCH RBC QN AUTO: 28.6 PG (ref 26.6–33)
MCHC RBC AUTO-ENTMCNC: 34.3 G/DL (ref 31.5–35.7)
MCV RBC AUTO: 83.3 FL (ref 79–97)
MONOCYTES # BLD AUTO: 0.36 10*3/MM3 (ref 0.1–0.9)
MONOCYTES NFR BLD AUTO: 6.2 % (ref 5–12)
NEUTROPHILS NFR BLD AUTO: 3.22 10*3/MM3 (ref 1.7–7)
NEUTROPHILS NFR BLD AUTO: 55.5 % (ref 42.7–76)
NRBC BLD AUTO-RTO: 0.2 /100 WBC (ref 0–0.2)
PLATELET # BLD AUTO: 378 10*3/MM3 (ref 140–450)
PMV BLD AUTO: 10.6 FL (ref 6–12)
POTASSIUM SERPL-SCNC: 4.8 MMOL/L (ref 3.5–5.2)
PROT SERPL-MCNC: 7.1 G/DL (ref 6–8.5)
RBC # BLD AUTO: 4.62 10*6/MM3 (ref 3.77–5.28)
SODIUM SERPL-SCNC: 137 MMOL/L (ref 136–145)
TRIGL SERPL-MCNC: 32 MG/DL (ref 0–150)
TSH SERPL DL<=0.05 MIU/L-ACNC: 0.81 UIU/ML (ref 0.27–4.2)
VIT B12 BLD-MCNC: 257 PG/ML (ref 211–946)
VLDLC SERPL-MCNC: 7 MG/DL (ref 5–40)
WBC NRBC COR # BLD: 5.8 10*3/MM3 (ref 3.4–10.8)

## 2022-12-13 PROCEDURE — 83036 HEMOGLOBIN GLYCOSYLATED A1C: CPT

## 2022-12-13 PROCEDURE — 85025 COMPLETE CBC W/AUTO DIFF WBC: CPT

## 2022-12-13 PROCEDURE — 99214 OFFICE O/P EST MOD 30 MIN: CPT | Performed by: NURSE PRACTITIONER

## 2022-12-13 PROCEDURE — 36415 COLL VENOUS BLD VENIPUNCTURE: CPT

## 2022-12-13 PROCEDURE — 82607 VITAMIN B-12: CPT

## 2022-12-13 PROCEDURE — 80053 COMPREHEN METABOLIC PANEL: CPT

## 2022-12-13 PROCEDURE — 84443 ASSAY THYROID STIM HORMONE: CPT

## 2022-12-13 PROCEDURE — 80061 LIPID PANEL: CPT

## 2022-12-13 PROCEDURE — 86803 HEPATITIS C AB TEST: CPT

## 2022-12-13 NOTE — PROGRESS NOTES
Chief Complaint  Establish Care    Subjective          Chandler Phelan presents to [facility] [place] to establish care. She has concerns today regarding bleeding from her rectum.  Nov 4th was the last time she had bleeding and it filled the toilet. There are times where she has bloating. Bleeding started in february of this year. She does not visualize hemorrhoids     Rectal Bleeding  This is a recurrent problem. The current episode started more than 1 month ago. The problem occurs every several days. The problem has been gradually worsening. Associated symptoms include abdominal pain. Nothing aggravates the symptoms. She has tried nothing for the symptoms. The treatment provided no relief.   Abdominal Pain  This is a recurrent problem. The current episode started more than 1 month ago. The onset quality is undetermined. The problem occurs every several days. The problem has been waxing and waning since onset. The pain is located in the LLQ and RLQ. The pain is mild. The quality of the pain is described as cramping. The pain does not radiate. Associated symptoms include constipation, diarrhea and hematochezia. Nothing relieves the symptoms.     Outpatient Medications Prior to Visit   Medication Sig Dispense Refill   • cyclobenzaprine (FLEXERIL) 5 MG tablet Take 1 tablet by mouth 3 (Three) Times a Day As Needed for Muscle Spasms. 30 tablet 0   • diclofenac (VOLTAREN) 50 MG EC tablet Take 1 tablet by mouth 2 (Two) Times a Day As Needed (back and neck pain). 30 tablet 0   • HYDROcodone-acetaminophen (NORCO) 5-325 MG per tablet Take 1 tablet by mouth Every 6 (Six) Hours As Needed (Pain) for up to 12 doses. 12 tablet 0   • ondansetron ODT (ZOFRAN-ODT) 4 MG disintegrating tablet Place 1 tablet on the tongue 4 (Four) Times a Day As Needed for Nausea or Vomiting. 12 tablet 0     No facility-administered medications prior to visit.       Review of Systems  "  Gastrointestinal: Positive for abdominal pain, constipation, diarrhea and hematochezia.         Objective   Vital Signs:   Visit Vitals  /62 (BP Location: Right arm, Patient Position: Sitting, Cuff Size: Adult)   Pulse 88   Resp 22   Ht 154.9 cm (61\")   Wt 83.7 kg (184 lb 9.6 oz)   SpO2 99%   BMI 34.88 kg/m²     Physical Exam  Vitals and nursing note reviewed.   Constitutional:       Appearance: She is well-developed.   HENT:      Head: Normocephalic and atraumatic.   Eyes:      General: Lids are normal.      Conjunctiva/sclera: Conjunctivae normal.   Neck:      Thyroid: No thyroid mass or thyromegaly.      Trachea: Trachea normal. No tracheal tenderness.   Cardiovascular:      Rate and Rhythm: Normal rate.      Pulses: Normal pulses.      Heart sounds: Normal heart sounds.   Pulmonary:      Effort: Pulmonary effort is normal. No respiratory distress.      Breath sounds: Normal breath sounds. No wheezing.   Abdominal:      General: Bowel sounds are normal. There is no distension.      Palpations: Abdomen is soft. There is no mass.      Tenderness: There is abdominal tenderness in the left lower quadrant.       Musculoskeletal:         General: Normal range of motion.      Cervical back: Normal range of motion. No edema.   Skin:     General: Skin is warm and dry.      Coloration: Skin is not pale.      Findings: No abrasion, erythema or lesion.   Neurological:      Mental Status: She is alert and oriented to person, place, and time.   Psychiatric:         Mood and Affect: Mood is not anxious. Affect is not inappropriate.         Speech: Speech normal.         Behavior: Behavior normal.         Thought Content: Thought content normal.         Judgment: Judgment normal. Judgment is not impulsive.        Result Review :                 Assessment and Plan    Diagnoses and all orders for this visit:    1. Encounter to establish care (Primary)  -     TSH; Future  -     Comprehensive Metabolic Panel; Future  -     " Hemoglobin A1c; Future  -     CBC & Differential; Future  -     Vitamin B12; Future  -     Lipid Panel; Future  -     Hepatitis C Antibody; Future    2. Screening for cervical cancer  -     TSH; Future  -     Comprehensive Metabolic Panel; Future  -     Hemoglobin A1c; Future  -     CBC & Differential; Future  -     Vitamin B12; Future  -     Lipid Panel; Future  -     Hepatitis C Antibody; Future  -     Ambulatory Referral to Obstetrics / Gynecology    3. Rectal bleeding  -     TSH; Future  -     Comprehensive Metabolic Panel; Future  -     Hemoglobin A1c; Future  -     CBC & Differential; Future  -     Vitamin B12; Future  -     Lipid Panel; Future  -     Hepatitis C Antibody; Future  -     Ambulatory Referral to Gastroenterology    4. Abnormal bowel movement  -     TSH; Future  -     Comprehensive Metabolic Panel; Future  -     Hemoglobin A1c; Future  -     CBC & Differential; Future  -     Vitamin B12; Future  -     Lipid Panel; Future  -     Hepatitis C Antibody; Future  -     Ambulatory Referral to Gastroenterology    5. Left lower quadrant abdominal pain  -     TSH; Future  -     Comprehensive Metabolic Panel; Future  -     Hemoglobin A1c; Future  -     CBC & Differential; Future  -     Vitamin B12; Future  -     Lipid Panel; Future  -     Hepatitis C Antibody; Future  -     Ambulatory Referral to Gastroenterology      Complete ordered lab work   We will call with results    Referral to GI     Please call the office if you have issues    If bleeding worsens please go to ER   Bleeding could be caused from hemorrhoids that are internal, however we will refer to gi first     Start stool softeners daily     We will continue to monitor       Follow Up   Return if symptoms worsen or fail to improve, for Next scheduled follow up.  Patient was given instructions and counseling regarding her condition or for health maintenance advice. Please see specific information pulled into the AVS if appropriate.           This  document has been electronically signed by SAVI Arias on December 14, 2022 10:47 CST

## 2022-12-16 ENCOUNTER — PATIENT ROUNDING (BHMG ONLY) (OUTPATIENT)
Dept: FAMILY MEDICINE CLINIC | Facility: CLINIC | Age: 22
End: 2022-12-16

## 2022-12-16 NOTE — PROGRESS NOTES
December 16, 2022    Hello, may I speak with Chandler Phelan?    My name is Vicki Knight     [facility] [address] [place] [address] PRIMARY CARE - [address] [address] [phone].    Before we get started may I verify your date of birth? 2000    I am calling to officially welcome you to our practice and ask about your recent visit. Is this a good time to talk? Yes    Tell me about your visit with us. What things went well?  Everything       We're always looking for ways to make our patients' experiences even better. Do you have recommendations on ways we may improve? No    Overall were you satisfied with your first visit to our practice? Yes       I appreciate you taking the time to speak with me today. Is there anything else I can do for you? No      Thank you, and have a great day.

## 2023-01-14 PROBLEM — S92.501A FRACTURE OF FOURTH TOE, RIGHT, CLOSED, INITIAL ENCOUNTER: Status: ACTIVE | Noted: 2023-01-14

## 2023-01-18 ENCOUNTER — OFFICE VISIT (OUTPATIENT)
Dept: GASTROENTEROLOGY | Facility: CLINIC | Age: 23
End: 2023-01-18
Payer: MEDICAID

## 2023-01-18 VITALS
HEIGHT: 60 IN | DIASTOLIC BLOOD PRESSURE: 72 MMHG | WEIGHT: 185.6 LBS | OXYGEN SATURATION: 98 % | HEART RATE: 68 BPM | BODY MASS INDEX: 36.44 KG/M2 | SYSTOLIC BLOOD PRESSURE: 112 MMHG

## 2023-01-18 DIAGNOSIS — R19.4 CHANGE IN BOWEL HABITS: ICD-10-CM

## 2023-01-18 DIAGNOSIS — R10.30 LOWER ABDOMINAL PAIN: ICD-10-CM

## 2023-01-18 DIAGNOSIS — K62.5 HEMORRHAGE OF ANUS AND RECTUM: Primary | ICD-10-CM

## 2023-01-18 PROCEDURE — 99204 OFFICE O/P NEW MOD 45 MIN: CPT | Performed by: PHYSICIAN ASSISTANT

## 2023-01-18 RX ORDER — POLYETHYLENE GLYCOL-3350 AND ELECTROLYTES WITH FLAVOR PACK 240; 5.84; 2.98; 6.72; 22.72 G/278.26G; G/278.26G; G/278.26G; G/278.26G; G/278.26G
4000 POWDER, FOR SOLUTION ORAL ONCE
Qty: 4000 ML | Refills: 0 | Status: SHIPPED | OUTPATIENT
Start: 2023-01-18 | End: 2023-01-18

## 2023-01-18 RX ORDER — DEXTROSE AND SODIUM CHLORIDE 5; .45 G/100ML; G/100ML
30 INJECTION, SOLUTION INTRAVENOUS CONTINUOUS PRN
Status: CANCELLED | OUTPATIENT
Start: 2023-01-24

## 2023-01-18 NOTE — PROGRESS NOTES
Chief Complaint   Patient presents with   • Rectal Bleeding     Has had problem for about 1 year   • Change in bowel habits     Referred By: Diane HOU       ENDO PROCEDURE ORDERED: EGD/COLON, change bowel, rectal bleeding, gerd    Subjective    Chandler Nicolette Phelan is a 22 y.o. female. she is being seen for consultation today at the request of SAVI Arias.    History of Present Illness    This 22-year-old female was sent for evaluation for change in bowel habits and rectal bleeding by SAVI Arias, who saw the patient with the above complaints and to establish on 12/13/2022. Laboratories at that time showed normal CMP, TSH, A1c, CBC. B12 was 257. Cholesterol panel showed an LDL of 104. Hepatitis C antibody non-reactive. The patient states she has heartburn with everything she eats. She denied nausea, vomiting, dysphagia. She states her stools used to very regular. Since she had an appendectomy under Dr. Crawford, she is having some problems with her bowel movements, she is seeing bright red blood, she thinks maybe from hemorrhoids, but she is passing blood in clots, a lot of mucous, pretty much every time she has a bowel movement. She tried using Preparation-H thinking it was hemorrhoids, but it made her itching worse. Stools are somewhat irregular, and fragmentary as well. Her weight has been increasing. She has never had a colonoscopy.     Patient had a CT abdomen and pelvis with contrast 08/16/2022, showed a possible early appendicitis, otherwise was read as normal. I did look at some of the images with the patient in the room. Laboratories on 08/17/2022: Her pathology was negative for appendix for any malignancy, etc. Urine drug screen positive for THC and opiates. HCG was negative. Urinalysis showed trace abnormality. Magnesium was low at 1.5, normal lipase, CBC. CMP showed glucose 108, otherwise normal.     The patient currently denies tobacco, alcohol or illicit substance use, was a previous  smoker. She has had an appendectomy, broken toe, bleeding problems. Family history of breast and cervical cancer. No known family history of GI tract malignancy or inflammatory bowel disease. Father age 49, mother age 52 in good health. She lists no spouse. One brother and 3 sister's, no children.     A/P: Patient with change in bowel habits following surgery. There was some questionable thickening of some of her loops of bowel. There was fluid in her small bowel loops on her CT scan. Her laboratories have been fairly unrevealing, but given her increased nausea, heartburn, and bowel habits, I have recommended EGD/colonoscopy. She is within age range for possible inflammatory bowel disease. We will attempt to do terminal ileum and do biopsies as indicated. Will plan follow-up after the above, further pending clinical course and the results of the above.     Thank you very much, Diane, for this consultation, and for allowing us to participate in the care of your patient. We will keep you informed.       The following portions of the patient's history were reviewed and updated as appropriate:   Past Medical History:   Diagnosis Date   • Back pain      Past Surgical History:   Procedure Laterality Date   • APPENDECTOMY N/A 2022    Procedure: APPENDECTOMY LAPAROSCOPIC;  Surgeon: Dejuan Crawford MD;  Location: [facility];  Service: General;  Laterality: N/A;     Family History   Problem Relation Age of Onset   • No Known Problems Mother    • No Known Problems Father    • No Known Problems Sister    • No Known Problems Sister    • No Known Problems Sister    • No Known Problems Brother      OB History    No obstetric history on file.       Allergies   Allergen Reactions   • Penicillins Anaphylaxis and Unknown - High Severity     Social History     Socioeconomic History   • Marital status:    Tobacco Use   • Smoking status: Former     Types: Cigarettes     Quit date: 2022     Years since quittin.7   •  "Smokeless tobacco: Never   Vaping Use   • Vaping Use: Never used   Substance and Sexual Activity   • Alcohol use: Not Currently   • Drug use: Yes     Types: Marijuana   • Sexual activity: Defer     Current Medications:  Prior to Admission medications    Not on File     Review of Systems  Review of Systems   Constitutional: Negative for unexpected weight change.   HENT: Negative for trouble swallowing.    Gastrointestinal: Positive for abdominal pain, anal bleeding, blood in stool and diarrhea. Negative for abdominal distention, constipation, nausea, rectal pain and vomiting.          Objective    /72 (BP Location: Right arm, Patient Position: Sitting)   Pulse 68   Ht 152.4 cm (60\")   Wt 84.2 kg (185 lb 9.6 oz)   LMP 12/19/2022 (Approximate)   SpO2 98%   BMI 36.25 kg/m²   Physical Exam  Vitals and nursing note reviewed.   Constitutional:       General: She is not in acute distress.     Appearance: She is well-developed. She is obese.      Comments: AA   HENT:      Head: Normocephalic and atraumatic.   Eyes:      Pupils: Pupils are equal, round, and reactive to light.   Cardiovascular:      Rate and Rhythm: Normal rate and regular rhythm.      Heart sounds: Normal heart sounds.   Pulmonary:      Effort: Pulmonary effort is normal.      Breath sounds: Normal breath sounds.   Abdominal:      General: Bowel sounds are normal. There is no distension or abdominal bruit.      Palpations: Abdomen is soft. Abdomen is not rigid. There is no shifting dullness or mass.      Tenderness: There is abdominal tenderness. There is no guarding or rebound.      Hernia: No hernia is present. There is no hernia in the ventral area.   Musculoskeletal:         General: Normal range of motion.      Cervical back: Normal range of motion.   Skin:     General: Skin is warm and dry.   Neurological:      Mental Status: She is alert and oriented to person, place, and time.   Psychiatric:         Behavior: Behavior normal.         " Thought Content: Thought content normal.         Judgment: Judgment normal.       Assessment & Plan      1. Hemorrhage of anus and rectum    2. Lower abdominal pain    3. Change in bowel habits    .   Diagnoses and all orders for this visit:    1. Hemorrhage of anus and rectum (Primary)  -     Case Request; Standing  -     dextrose 5 % and sodium chloride 0.45 % infusion  -     Case Request    2. Lower abdominal pain  -     Case Request; Standing  -     dextrose 5 % and sodium chloride 0.45 % infusion  -     Case Request    3. Change in bowel habits  -     Case Request; Standing  -     dextrose 5 % and sodium chloride 0.45 % infusion  -     Case Request    Other orders  -     Follow Anesthesia Guidelines / Protocol; Future  -     Obtain Informed Consent; Future  -     Obtain Informed Consent; Standing  -     POC Glucose Once; Standing        Orders placed during this encounter include:  Orders Placed This Encounter   Procedures   • Obtain Informed Consent     Standing Status:   Future     Order Specific Question:   Informed Consent Given For     Answer:   ESOPHAGOGASTRODUODENOSCOPY and colonoscopy       Medications prescribed:  No orders of the defined types were placed in this encounter.      Requested Prescriptions      No prescriptions requested or ordered in this encounter       Review and/or summary of lab tests, radiology, procedures, medications. Review and summary of old records and obtaining of history. The risks and benefits of my recommendations, as well as other treatment options were discussed with the patient today. Questions were answered.    Follow-up: Return in about 1 month (around 2/18/2023), or if symptoms worsen or fail to improve.     ESOPHAGOGASTRODUODENOSCOPY (N/A), COLONOSCOPY--look TI, bx (N/A)      This document has been electronically signed by Dane Leyva PA-C on January 20, 2023 14:10 CST      Results for orders placed or performed in visit on 12/13/22   CBC Auto Differential     Specimen: Blood   Result Value Ref Range    WBC 5.80 3.40 - 10.80 10*3/mm3    RBC 4.62 3.77 - 5.28 10*6/mm3    Hemoglobin 13.2 12.0 - 15.9 g/dL    Hematocrit 38.5 34.0 - 46.6 %    MCV 83.3 79.0 - 97.0 fL    MCH 28.6 26.6 - 33.0 pg    MCHC 34.3 31.5 - 35.7 g/dL    RDW 13.5 12.3 - 15.4 %    RDW-SD 41.3 37.0 - 54.0 fl    MPV 10.6 6.0 - 12.0 fL    Platelets 378 140 - 450 10*3/mm3    Neutrophil % 55.5 42.7 - 76.0 %    Lymphocyte % 35.5 19.6 - 45.3 %    Monocyte % 6.2 5.0 - 12.0 %    Eosinophil % 1.9 0.3 - 6.2 %    Basophil % 0.7 0.0 - 1.5 %    Immature Grans % 0.2 0.0 - 0.5 %    Neutrophils, Absolute 3.22 1.70 - 7.00 10*3/mm3    Lymphocytes, Absolute 2.06 0.70 - 3.10 10*3/mm3    Monocytes, Absolute 0.36 0.10 - 0.90 10*3/mm3    Eosinophils, Absolute 0.11 0.00 - 0.40 10*3/mm3    Basophils, Absolute 0.04 0.00 - 0.20 10*3/mm3    Immature Grans, Absolute 0.01 0.00 - 0.05 10*3/mm3    nRBC 0.2 0.0 - 0.2 /100 WBC   Hepatitis C Antibody    Specimen: Blood   Result Value Ref Range    Hepatitis C Ab Non-Reactive Non-Reactive   TSH    Specimen: Blood   Result Value Ref Range    TSH 0.811 0.270 - 4.200 uIU/mL   Hemoglobin A1c    Specimen: Blood   Result Value Ref Range    Hemoglobin A1C 5.00 4.80 - 5.60 %   Vitamin B12    Specimen: Blood   Result Value Ref Range    Vitamin B-12 257 211 - 946 pg/mL   Lipid Panel    Specimen: Blood   Result Value Ref Range    Total Cholesterol 171 0 - 200 mg/dL    Triglycerides 32 0 - 150 mg/dL    HDL Cholesterol 60 40 - 60 mg/dL    LDL Cholesterol  104 (H) 0 - 100 mg/dL    VLDL Cholesterol 7 5 - 40 mg/dL    LDL/HDL Ratio 1.74    Comprehensive Metabolic Panel    Specimen: Blood   Result Value Ref Range    Glucose 95 65 - 99 mg/dL    BUN 12 6 - 20 mg/dL    Creatinine 0.73 0.57 - 1.00 mg/dL    Sodium 137 136 - 145 mmol/L    Potassium 4.8 3.5 - 5.2 mmol/L    Chloride 104 98 - 107 mmol/L    CO2 27.1 22.0 - 29.0 mmol/L    Calcium 9.2 8.6 - 10.5 mg/dL    Total Protein 7.1 6.0 - 8.5 g/dL    Albumin 4.60 3.50  - 5.20 g/dL    ALT (SGPT) 6 1 - 33 U/L    AST (SGOT) 15 1 - 32 U/L    Alkaline Phosphatase 57 39 - 117 U/L    Total Bilirubin 0.2 0.0 - 1.2 mg/dL    Globulin 2.5 gm/dL    A/G Ratio 1.8 g/dL    BUN/Creatinine Ratio 16.4 7.0 - 25.0    Anion Gap 5.9 5.0 - 15.0 mmol/L    eGFR 119.4 >60.0 mL/min/1.73   Results for orders placed or performed during the hospital encounter of 22   COVID-19 and FLU A/B PCR - Swab, Nasopharynx    Specimen: Nasopharynx; Swab   Result Value Ref Range    COVID19 Detected (C) Not Detected - Ref. Range    Influenza A PCR Not Detected Not Detected    Influenza B PCR Not Detected Not Detected   TISSUE EXAM, [facility] ([facility])    Specimen: Large Intestine, Appendix; Tissue   Result Value Ref Range    Reference Lab Report       [facility]  [address] [place] [address]  Phone: [phone] or [phone]  Fax: [phone]  Charles Ramos M.D., Medical Director    PATIENT NAME                           LABORATORY NO.  LESA SALCEDO.                  [id number]  [id number]                         AGE              SEX  N           CLIENT REF #  [facility]           21      2000  F    [id number]   [id number]    [place]                       REQUESTING M.D.     ATTENDING M.D.     COPY TO.  [address] [place] [address]             DATE COLLECTED      DATE RECEIVED      DATE REPORTED  2022    DIAGNOSIS:  APPENDIX:  Acute appendicitis    JBS/sdl    CLINICAL HISTORY:  Epigastric pain    SPECIMENS RECEIVED:  APPENDIX    MICROSCOPIC DESCRIPTION:  Tissue blocks are prepared and slides are examined microscopically on all  specimens. See diagnosis for  details.    Professional interpretation rendered by Cornelio Cage M.D. at [facility],  [facility], [address] [place] [address].    GROSS DESCRIPTION:  Received in formalin  "labeled with \"large intestine, appendix\" is a 6.4 cm in  length, 4.0 cm in diameter vermiform appendix with an attached yellow  lobulated, intact, up to 1.4 cm mesoappendix.  The serosal surface is tan-red,  focally hemorrhagic, smooth and glistening with a moderate amount of tan-  yellow exudate located on the entire length of the appendix.  The staple proximal  margin is removed and inked blue.  The specimen is serially sectioned to reveal  a 0.4 to 0.6 cm lumen with an up to 0.3 cm wall thickness.  A fecalith is not  appreciated.  No masses or lesions are identified.  Representative sections are  submitted in A1.  [facility]    Section code:  A1: Proximal margin, en face, half distal tip, body    REVIEWED, DIAGNOSED AND ELECTRONICALLY  SIGNED BY:    Cornelio Cage M.D.  CPT CODES:  93437     Urine Drug Screen - Urine, Clean Catch    Specimen: Urine, Clean Catch   Result Value Ref Range    THC, Screen, Urine Positive (A) Negative    Phencyclidine (PCP), Urine Negative Negative    Cocaine Screen, Urine Negative Negative    Methamphetamine, Ur Negative Negative    Opiate Screen Positive (A) Negative    Amphetamine Screen, Urine Negative Negative    Benzodiazepine Screen, Urine Negative Negative    Tricyclic Antidepressants Screen Negative Negative    Methadone Screen, Urine Negative Negative    Barbiturates Screen, Urine Negative Negative    Oxycodone Screen, Urine Negative Negative    Propoxyphene Screen Negative Negative    Buprenorphine, Screen, Urine Negative Negative   Pregnancy, Urine - Urine, Clean Catch    Specimen: Urine, Clean Catch   Result Value Ref Range    HCG, Urine QL Negative Negative   Results for orders placed or performed during the hospital encounter of 08/16/22   Gold Top - SST   Result Value Ref Range    Extra Tube Hold for add-ons.    Green Top (Gel)   Result Value Ref Range    Extra Tube Hold for add-ons.    Urinalysis, Microscopic Only - Urine, Clean Catch    Specimen: Urine, Clean Catch "   Result Value Ref Range    RBC, UA None Seen None Seen /HPF    WBC, UA 13-20 (A) None Seen, 0-2, 3-5 /HPF    Bacteria, UA 3+ (A) None Seen /HPF    Squamous Epithelial Cells, UA 13-20 (A) None Seen, 0-2 /HPF    Hyaline Casts, UA None Seen None Seen /LPF    Methodology Manual Light Microscopy    Urinalysis With Microscopic If Indicated (No Culture) - Urine, Clean Catch    Specimen: Urine, Clean Catch   Result Value Ref Range    Color, UA Yellow Yellow, Straw, Dark Yellow, Yudith    Appearance, UA Cloudy (A) Clear    pH, UA 6.5 5.0 - 9.0    Specific [place], UA 1.026 1.003 - 1.030    Glucose, UA Negative Negative    Ketones, UA 80 mg/dL (3+) (A) Negative    Bilirubin, UA Negative Negative    Blood, UA Negative Negative    Protein, UA Trace (A) Negative    Leuk Esterase, UA Trace (A) Negative    Nitrite, UA Negative Negative    Urobilinogen, UA 1.0 E.U./dL 0.2 - 1.0 E.U./dL   CBC Auto Differential    Specimen: Blood   Result Value Ref Range    WBC 7.71 3.40 - 10.80 10*3/mm3    RBC 4.71 3.77 - 5.28 10*6/mm3    Hemoglobin 12.9 12.0 - 15.9 g/dL    Hematocrit 39.4 34.0 - 46.6 %    MCV 83.7 79.0 - 97.0 fL    MCH 27.4 26.6 - 33.0 pg    MCHC 32.7 31.5 - 35.7 g/dL    RDW 13.6 12.3 - 15.4 %    RDW-SD 42.1 37.0 - 54.0 fl    MPV 10.1 6.0 - 12.0 fL    Platelets 185 140 - 450 10*3/mm3    Neutrophil % 80.9 (H) 42.7 - 76.0 %    Lymphocyte % 13.4 (L) 19.6 - 45.3 %    Monocyte % 4.8 (L) 5.0 - 12.0 %    Eosinophil % 0.1 (L) 0.3 - 6.2 %    Basophil % 0.3 0.0 - 1.5 %    Immature Grans % 0.5 0.0 - 0.5 %    Neutrophils, Absolute 6.24 1.70 - 7.00 10*3/mm3    Lymphocytes, Absolute 1.03 0.70 - 3.10 10*3/mm3    Monocytes, Absolute 0.37 0.10 - 0.90 10*3/mm3    Eosinophils, Absolute 0.01 0.00 - 0.40 10*3/mm3    Basophils, Absolute 0.02 0.00 - 0.20 10*3/mm3    Immature Grans, Absolute 0.04 0.00 - 0.05 10*3/mm3    nRBC 0.0 0.0 - 0.2 /100 WBC     *Note: Due to a large number of results and/or encounters for the requested time period, some results  have not been displayed. A complete set of results can be found in Results Review.

## 2023-01-24 ENCOUNTER — HOSPITAL ENCOUNTER (OUTPATIENT)
Facility: HOSPITAL | Age: 23
Setting detail: HOSPITAL OUTPATIENT SURGERY
Discharge: HOME | End: 2023-01-24
Attending: INTERNAL MEDICINE | Admitting: INTERNAL MEDICINE
Payer: MEDICAID

## 2023-01-24 ENCOUNTER — ANESTHESIA (OUTPATIENT)
Dept: GASTROENTEROLOGY | Facility: HOSPITAL | Age: 23
End: 2023-01-24
Payer: MEDICAID

## 2023-01-24 ENCOUNTER — ANESTHESIA EVENT (OUTPATIENT)
Dept: GASTROENTEROLOGY | Facility: HOSPITAL | Age: 23
End: 2023-01-24
Payer: MEDICAID

## 2023-01-24 ENCOUNTER — OFFICE VISIT (OUTPATIENT)
Dept: PODIATRY | Facility: CLINIC | Age: 23
End: 2023-01-24
Payer: MEDICAID

## 2023-01-24 VITALS — BODY MASS INDEX: 35.93 KG/M2 | OXYGEN SATURATION: 99 % | WEIGHT: 183 LBS | HEIGHT: 60 IN | HEART RATE: 80 BPM

## 2023-01-24 VITALS
RESPIRATION RATE: 17 BRPM | BODY MASS INDEX: 35.53 KG/M2 | TEMPERATURE: 97 F | HEIGHT: 60 IN | WEIGHT: 181 LBS | DIASTOLIC BLOOD PRESSURE: 49 MMHG | SYSTOLIC BLOOD PRESSURE: 99 MMHG | HEART RATE: 81 BPM | OXYGEN SATURATION: 97 %

## 2023-01-24 DIAGNOSIS — S92.919A CLOSED NONDISPLACED FRACTURE OF DISTAL PHALANX OF TOE: Primary | ICD-10-CM

## 2023-01-24 DIAGNOSIS — R10.30 LOWER ABDOMINAL PAIN: ICD-10-CM

## 2023-01-24 DIAGNOSIS — R19.4 CHANGE IN BOWEL HABITS: ICD-10-CM

## 2023-01-24 DIAGNOSIS — K62.5 HEMORRHAGE OF ANUS AND RECTUM: ICD-10-CM

## 2023-01-24 DIAGNOSIS — M79.671 RIGHT FOOT PAIN: ICD-10-CM

## 2023-01-24 PROCEDURE — 28510 TREATMENT OF TOE FRACTURE: CPT | Performed by: NURSE PRACTITIONER

## 2023-01-24 PROCEDURE — 99213 OFFICE O/P EST LOW 20 MIN: CPT | Performed by: NURSE PRACTITIONER

## 2023-01-24 PROCEDURE — 45380 COLONOSCOPY AND BIOPSY: CPT | Performed by: INTERNAL MEDICINE

## 2023-01-24 PROCEDURE — 43239 EGD BIOPSY SINGLE/MULTIPLE: CPT | Performed by: INTERNAL MEDICINE

## 2023-01-24 PROCEDURE — 25010000002 PROPOFOL 10 MG/ML EMULSION

## 2023-01-24 RX ORDER — DEXTROSE AND SODIUM CHLORIDE 5; .45 G/100ML; G/100ML
30 INJECTION, SOLUTION INTRAVENOUS CONTINUOUS PRN
Status: DISCONTINUED | OUTPATIENT
Start: 2023-01-24 | End: 2023-01-24 | Stop reason: HOSPADM

## 2023-01-24 RX ORDER — PROPOFOL 10 MG/ML
VIAL (ML) INTRAVENOUS AS NEEDED
Status: DISCONTINUED | OUTPATIENT
Start: 2023-01-24 | End: 2023-01-24 | Stop reason: SURG

## 2023-01-24 RX ORDER — LIDOCAINE HYDROCHLORIDE 20 MG/ML
INJECTION, SOLUTION INTRAVENOUS AS NEEDED
Status: DISCONTINUED | OUTPATIENT
Start: 2023-01-24 | End: 2023-01-24 | Stop reason: SURG

## 2023-01-24 RX ADMIN — PROPOFOL 100 MG: 10 INJECTION, EMULSION INTRAVENOUS at 13:13

## 2023-01-24 RX ADMIN — DEXTROSE AND SODIUM CHLORIDE 30 ML/HR: 5; 450 INJECTION, SOLUTION INTRAVENOUS at 12:58

## 2023-01-24 RX ADMIN — LIDOCAINE HYDROCHLORIDE 80 MG: 20 INJECTION, SOLUTION INTRAVENOUS at 13:13

## 2023-01-24 RX ADMIN — PROPOFOL 100 MG: 10 INJECTION, EMULSION INTRAVENOUS at 13:14

## 2023-01-24 RX ADMIN — PROPOFOL 370 MG: 10 INJECTION, EMULSION INTRAVENOUS at 13:15

## 2023-01-24 NOTE — ANESTHESIA POSTPROCEDURE EVALUATION
"Pharmacy Dosing Service  Pharmacokinetics  Vancomycin Initial Evaluation    Assessment/Action/Plan:  Initiated Vancomycin 1,000 mg IVPB every 24 hours, following a 2,000 mg loading dose given in ER. Vancomycin trough ordered when pharmacokinetically appropriate. Pharmacy will monitor renal function and adjust dose accordingly.     Subjective:  Bindu Fountain is a 74 y.o. female with a Vancomycin \"Pharmacy to Dose\" consult for the treatment of Pneumonia (x7 days) .    Complicating factors:  Diabetes mellitus  Acute renal failure  obesity    Objective:  Ht: 167.6 cm (65.98\"); Wt: 89.9 kg (198 lb 1.6 oz)  Estimated Creatinine Clearance: 34.4 mL/min (A) (by C-G formula based on SCr of 1.62 mg/dL (H)).   Lab Results   Component Value Date    CREATININE 1.62 (H) 06/04/2018      Lab Results   Component Value Date    WBC 29.80 (H) 06/04/2018      Baseline culture results:  Microbiology Results     Procedure Component Value Units Date/Time   Blood Culture - Blood, Blood, Venous Line [[id number]] Collected: 06/04/18 2147   Specimen: Blood from Arm, Right Updated: 06/04/18 2203   Blood Culture - Blood, Blood, Venous Line [[id number]] Collected: 06/04/18 2145   Specimen: Blood from Hand, Left Updated: 06/04/18 2203   Urine Culture - Urine, [[id number]] Collected: 06/04/18 2028   Specimen: Urine from Urine, Catheter Updated: 06/04/18 2043         Hasmukh Gottlieb [facility]  06/05/18 2:16 AM    " Patient: Chandler Phelan    Procedure Summary     Date: 01/24/23 Room / Location: [facility] ENDOSCOPY 2 / [facility] ENDOSCOPY    Anesthesia Start: 1309 Anesthesia Stop: 1326    Procedures:       ESOPHAGOGASTRODUODENOSCOPY      COLONOSCOPY--look TI, bx Diagnosis:       Hemorrhage of anus and rectum      Lower abdominal pain      Change in bowel habits      (Hemorrhage of anus and rectum [K62.5])      (Lower abdominal pain [R10.30])      (Change in bowel habits [R19.4])    Surgeons: Bautista Vogel MD Provider: Carl Ribeiro CRNA    Anesthesia Type: general ASA Status: 2          Anesthesia Type: general    Vitals  No vitals data found for the desired time range.          Post Anesthesia Care and Evaluation    Patient location during evaluation: floor  Patient participation: complete - patient cannot participate  Level of consciousness: responsive to noxious stimuli  Pain score: 0  Pain management: adequate    Airway patency: patent  Anesthetic complications: No anesthetic complications  PONV Status: none  Cardiovascular status: acceptable  Respiratory status: acceptable  Hydration status: acceptable

## 2023-01-24 NOTE — ANESTHESIA PREPROCEDURE EVALUATION
Anesthesia Evaluation     Patient summary reviewed and Nursing notes reviewed   NPO Solid Status: > 8 hours  NPO Liquid Status: < 2 hours           Airway   Mallampati: I  TM distance: >3 FB  Neck ROM: full  Dental - normal exam     Pulmonary - negative pulmonary ROS and normal exam   Cardiovascular - negative cardio ROS and normal exam  Exercise tolerance: excellent (>7 METS)    Rhythm: regular  Rate: normal        Neuro/Psych- negative ROS  GI/Hepatic/Renal/Endo    (+) obesity,  GI bleeding lower active bleeding,     Musculoskeletal     (+) back pain,   Abdominal  - normal exam   Substance History   (+) drug use     OB/GYN          Other        ROS/Med Hx Other: Marijuana use                   Anesthesia Plan    ASA 2     general   total IV anesthesia  intravenous induction     Anesthetic plan, risks, benefits, and alternatives have been provided, discussed and informed consent has been obtained with: patient.  Pre-procedure education provided  Plan discussed with CRNA and resident.        CODE STATUS:

## 2023-01-24 NOTE — PROGRESS NOTES
"Chandler Phelan  2000  22 y.o. female      2023    Chief Complaint   Patient presents with   • Right Foot - Pain       History of Present Illness    Chandler Phelan is a 22 y.o.female presents to the clinic today with chief complaint of a right 4th toe fracture. Patient hit her toe on a chair on 2023. Xrays obtained on 2023.       Past Medical History:   Diagnosis Date   • Back pain          Past Surgical History:   Procedure Laterality Date   • APPENDECTOMY N/A 2022    Procedure: APPENDECTOMY LAPAROSCOPIC;  Surgeon: Dejuan Crawford MD;  Location: [facility];  Service: General;  Laterality: N/A;         Family History   Problem Relation Age of Onset   • No Known Problems Mother    • No Known Problems Father    • No Known Problems Sister    • No Known Problems Sister    • No Known Problems Sister    • No Known Problems Brother        Allergies   Allergen Reactions   • Penicillins Anaphylaxis and Unknown - High Severity       Social History     Socioeconomic History   • Marital status:    Tobacco Use   • Smoking status: Former     Types: Cigarettes     Quit date: 2022     Years since quittin.7   • Smokeless tobacco: Never   Vaping Use   • Vaping Use: Never used   Substance and Sexual Activity   • Alcohol use: Not Currently   • Drug use: Yes     Types: Marijuana   • Sexual activity: Defer         No current outpatient medications on file.     No current facility-administered medications for this visit.       Review of Systems   Constitutional: Negative.    HENT: Negative.    Eyes: Negative.    Respiratory: Negative.    Cardiovascular: Negative.    Gastrointestinal: Negative.    Endocrine: Negative.    Genitourinary: Negative.    Musculoskeletal:        Foot pain   Skin: Negative.    Allergic/Immunologic: Negative.    Neurological: Negative.    Hematological: Negative.    Psychiatric/Behavioral: Negative.          OBJECTIVE    Pulse 80   Ht 152.4 cm (60\")   Wt 83 kg " (183 lb)   LMP 01/18/2023   SpO2 99%   BMI 35.74 kg/m²     Physical Exam  Vitals reviewed.   Constitutional:       Appearance: Normal appearance. She is well-developed.   HENT:      Head: Normocephalic and atraumatic.   Neck:      Trachea: Trachea and phonation normal.   Cardiovascular:      Pulses:           Dorsalis pedis pulses are 2+ on the right side and 2+ on the left side.        Posterior tibial pulses are 2+ on the right side and 2+ on the left side.   Pulmonary:      Effort: Pulmonary effort is normal. No respiratory distress.   Abdominal:      General: There is no distension.      Palpations: Abdomen is soft.   Musculoskeletal:        Feet:    Feet:      Right foot:      Skin integrity: Skin integrity normal.      Toenail Condition: Right toenails are normal.      Left foot:      Skin integrity: Skin integrity normal.      Toenail Condition: Left toenails are normal.   Skin:     General: Skin is warm and dry.   Neurological:      Mental Status: She is alert and oriented to person, place, and time.      GCS: GCS eye subscore is 4. GCS verbal subscore is 5. GCS motor subscore is 6.   Psychiatric:         Speech: Speech normal.         Behavior: Behavior normal. Behavior is cooperative.         Thought Content: Thought content normal.         Judgment: Judgment normal.                Procedures        ASSESSMENT AND PLAN    Diagnoses and all orders for this visit:    1. Closed nondisplaced fracture of distal phalanx of toe (Primary)    2. Right foot pain        Reviewed the x-ray with the patient and her family member and recommended continuing the weight modification and immobilization in a cam walker boot, ice, elevation, Tylenol/ibuprofen for pain and follow-up in 2 weeks for recheck with repeat x-rays of the fourth toe.  She verbalized understanding plan of care will contact the office in the meantime for any worsening symptoms.  She did not need a work note today.          This document has been  electronically signed by Anjum HOU, FNP-C, ONP-C on January 24, 2023 11:01 CST

## 2023-01-30 LAB — REF LAB TEST METHOD: NORMAL

## 2023-02-01 ENCOUNTER — OFFICE VISIT (OUTPATIENT)
Dept: GASTROENTEROLOGY | Facility: CLINIC | Age: 23
End: 2023-02-01
Payer: MEDICAID

## 2023-02-01 VITALS
HEART RATE: 78 BPM | OXYGEN SATURATION: 99 % | SYSTOLIC BLOOD PRESSURE: 116 MMHG | BODY MASS INDEX: 35.77 KG/M2 | WEIGHT: 182.2 LBS | DIASTOLIC BLOOD PRESSURE: 64 MMHG | HEIGHT: 60 IN

## 2023-02-01 DIAGNOSIS — K62.5 HEMORRHAGE OF ANUS AND RECTUM: Primary | ICD-10-CM

## 2023-02-01 DIAGNOSIS — K64.8 OTHER HEMORRHOIDS: ICD-10-CM

## 2023-02-01 DIAGNOSIS — K59.00 CONSTIPATION, UNSPECIFIED CONSTIPATION TYPE: ICD-10-CM

## 2023-02-01 DIAGNOSIS — A04.8 HELICOBACTER PYLORI INFECTION: ICD-10-CM

## 2023-02-01 DIAGNOSIS — K62.89 ANAL OR RECTAL PAIN: ICD-10-CM

## 2023-02-01 PROCEDURE — 99213 OFFICE O/P EST LOW 20 MIN: CPT | Performed by: PHYSICIAN ASSISTANT

## 2023-02-01 RX ORDER — TETRACYCLINE HYDROCHLORIDE 500 MG/1
500 CAPSULE ORAL 4 TIMES DAILY
Qty: 56 CAPSULE | Refills: 0 | Status: SHIPPED | OUTPATIENT
Start: 2023-02-01

## 2023-02-01 RX ORDER — HYDROCORTISONE ACETATE 25 MG/1
25 SUPPOSITORY RECTAL 2 TIMES DAILY PRN
Qty: 24 SUPPOSITORY | Refills: 1 | Status: SHIPPED | OUTPATIENT
Start: 2023-02-01

## 2023-02-01 RX ORDER — METRONIDAZOLE 500 MG/1
500 TABLET ORAL 2 TIMES DAILY
Qty: 20 TABLET | Refills: 0 | Status: SHIPPED | OUTPATIENT
Start: 2023-02-01

## 2023-02-01 RX ORDER — OMEPRAZOLE 20 MG/1
20 CAPSULE, DELAYED RELEASE ORAL 2 TIMES DAILY
Qty: 60 CAPSULE | Refills: 5 | Status: SHIPPED | OUTPATIENT
Start: 2023-02-01

## 2023-02-01 NOTE — PROGRESS NOTES
Chief Complaint   Patient presents with   • Follow-up     After Colon/EGD done on: 01/24/23   • Rectal Bleeding   • Change in bowel habits       ENDO PROCEDURE ORDERED:    Louis Phelan is a 22 y.o. female. she is here today for follow-up.    History of Present Illness    Patient is seen on a recheck of her rectal bleeding, change in bowel habits. Last seen on 01/18/2023. Patient underwent EGD/colonoscopy on 01/24/2023 that showed grade 1 esophagitis, gastritis. Biopsy from the GE junction showed reactive gastric and squamous mucosa. Antral biopsy showed active chronic gastritis positive for H. pylori. Small bowel biopsy was negative. Colonoscopy showed internal hemorrhoids were small. Biopsy from the terminal ileum and colon were negative. Recommended repeat colonoscopy at age 45. Patient currently denies abdominal pain, heartburn, nausea or vomiting. Patient states she is still passing slimy stools that are little balls. She states she has pain with bowel movements and feels like it has to pass by something but she has had no bleeding. She has tried mineral oil with some improvement. Weight is down 3.5 pounds since last visit.     ASSESSMENT/PLAN:  Patient with grade 1 esophagitis, H. pylori positive gastritis. We will treat her H. pylori. She appears to have some enlarged anal papillae. We will give her some Anusol-HC suppositories to see if this helps to reduce the area. Would alternatively consider other medications. We will give her a trial on Linzess 72 mcg to try to help her bowels to move more. She was encouraged to increase the fiber in her diet. I was concerned about giving her too many medications at once. I have asked her to complete the treatment for H. pylori first and then try the Linzess depending on how she is doing. We will plan follow-up in 1 month. Further pending clinical course and the results of the above.  Consider trial of Nifedipine/lidoaine.    The following portions  "of the patient's history were reviewed and updated as appropriate:   Past Medical History:   Diagnosis Date   • Back pain      Past Surgical History:   Procedure Laterality Date   • APPENDECTOMY N/A 2022    Procedure: APPENDECTOMY LAPAROSCOPIC;  Surgeon: Dejuan Crawford MD;  Location: [facility] OR;  Service: General;  Laterality: N/A;   • COLONOSCOPY N/A 2023    Procedure: COLONOSCOPY--look TI, bx;  Surgeon: Bautista Vogel MD;  Location: [facility] ENDOSCOPY;  Service: Gastroenterology;  Laterality: N/A;   • ENDOSCOPY N/A 2023    Procedure: ESOPHAGOGASTRODUODENOSCOPY;  Surgeon: Bautista Vogel MD;  Location: [facility] ENDOSCOPY;  Service: Gastroenterology;  Laterality: N/A;     Family History   Problem Relation Age of Onset   • No Known Problems Mother    • No Known Problems Father    • No Known Problems Sister    • No Known Problems Sister    • No Known Problems Sister    • No Known Problems Brother      OB History    No obstetric history on file.       Allergies   Allergen Reactions   • Penicillins Anaphylaxis and Unknown - High Severity     Social History     Socioeconomic History   • Marital status:    Tobacco Use   • Smoking status: Former     Types: Cigarettes     Quit date: 2022     Years since quittin.7   • Smokeless tobacco: Never   Vaping Use   • Vaping Use: Never used   Substance and Sexual Activity   • Alcohol use: Not Currently   • Drug use: Yes     Types: Marijuana   • Sexual activity: Defer     Current Medications:  Prior to Admission medications    Not on File     Review of Systems  Review of Systems       Objective    /64 (BP Location: Right arm, Patient Position: Sitting, Cuff Size: Large Adult)   Pulse 78   Ht 152.4 cm (60\")   Wt 82.6 kg (182 lb 3.2 oz)   LMP 2023 (Exact Date)   SpO2 99%   BMI 35.58 kg/m²   Physical Exam  Vitals and nursing note reviewed.   Constitutional:       General: She is not in acute distress.     Appearance: She is " well-developed.   HENT:      Head: Normocephalic and atraumatic.   Eyes:      Pupils: Pupils are equal, round, and reactive to light.   Cardiovascular:      Rate and Rhythm: Normal rate and regular rhythm.      Heart sounds: Normal heart sounds.   Pulmonary:      Effort: Pulmonary effort is normal.      Breath sounds: Normal breath sounds.   Abdominal:      General: Bowel sounds are normal. There is no distension or abdominal bruit.      Palpations: Abdomen is soft. Abdomen is not rigid. There is no shifting dullness or mass.      Tenderness: There is abdominal tenderness. There is no guarding or rebound.      Hernia: No hernia is present. There is no hernia in the ventral area.   Musculoskeletal:         General: Normal range of motion.      Cervical back: Normal range of motion.   Skin:     General: Skin is warm and dry.   Neurological:      Mental Status: She is alert and oriented to person, place, and time.   Psychiatric:         Behavior: Behavior normal.         Thought Content: Thought content normal.         Judgment: Judgment normal.       Assessment & Plan      1. Hemorrhage of anus and rectum    2. Other hemorrhoids    3. Helicobacter pylori infection    4. Anal or rectal pain    5. Constipation, unspecified constipation type    .   Diagnoses and all orders for this visit:    1. Hemorrhage of anus and rectum (Primary)    2. Other hemorrhoids    3. Helicobacter pylori infection    4. Anal or rectal pain    5. Constipation, unspecified constipation type    Other orders  -     omeprazole (priLOSEC) 20 MG capsule; Take 1 capsule by mouth 2 (Two) Times a Day.  Dispense: 60 capsule; Refill: 5  -     metroNIDAZOLE (FLAGYL) 500 MG tablet; Take 1 tablet by mouth 2 (Two) Times a Day.  Dispense: 20 tablet; Refill: 0  -     Bismuth Subsalicylate 525 MG/15ML suspension; Take 15 mL by mouth 4 (Four) Times a Day.  Dispense: 840 mL; Refill: 0  -     tetracycline (ACHROMYCIN,SUMYCIN) 500 MG capsule; Take 1 capsule by  mouth 4 (Four) Times a Day.  Dispense: 56 capsule; Refill: 0  -     hydrocortisone (ANUSOL-HC) 25 MG suppository; Insert 1 suppository into the rectum 2 (Two) Times a Day As Needed for Hemorrhoids (rectal discomfort).  Dispense: 24 suppository; Refill: 1  -     ondansetron ODT (ZOFRAN-ODT) 4 MG disintegrating tablet; Place 1 tablet on the tongue Every 8 (Eight) Hours As Needed for Nausea or Vomiting.  Dispense: 30 tablet; Refill: 0        Orders placed during this encounter include:  No orders of the defined types were placed in this encounter.      Medications prescribed:  New Medications Ordered This Visit   Medications   • omeprazole (priLOSEC) 20 MG capsule     Sig: Take 1 capsule by mouth 2 (Two) Times a Day.     Dispense:  60 capsule     Refill:  5   • metroNIDAZOLE (FLAGYL) 500 MG tablet     Sig: Take 1 tablet by mouth 2 (Two) Times a Day.     Dispense:  20 tablet     Refill:  0   • Bismuth Subsalicylate 525 MG/15ML suspension     Sig: Take 15 mL by mouth 4 (Four) Times a Day.     Dispense:  840 mL     Refill:  0   • tetracycline (ACHROMYCIN,SUMYCIN) 500 MG capsule     Sig: Take 1 capsule by mouth 4 (Four) Times a Day.     Dispense:  56 capsule     Refill:  0   • hydrocortisone (ANUSOL-HC) 25 MG suppository     Sig: Insert 1 suppository into the rectum 2 (Two) Times a Day As Needed for Hemorrhoids (rectal discomfort).     Dispense:  24 suppository     Refill:  1   • ondansetron ODT (ZOFRAN-ODT) 4 MG disintegrating tablet     Sig: Place 1 tablet on the tongue Every 8 (Eight) Hours As Needed for Nausea or Vomiting.     Dispense:  30 tablet     Refill:  0       Requested Prescriptions     Signed Prescriptions Disp Refills   • omeprazole (priLOSEC) 20 MG capsule 60 capsule 5     Sig: Take 1 capsule by mouth 2 (Two) Times a Day.   • metroNIDAZOLE (FLAGYL) 500 MG tablet 20 tablet 0     Sig: Take 1 tablet by mouth 2 (Two) Times a Day.   • Bismuth Subsalicylate 525 MG/15ML suspension 840 mL 0     Sig: Take 15 mL by  mouth 4 (Four) Times a Day.   • tetracycline (ACHROMYCIN,SUMYCIN) 500 MG capsule 56 capsule 0     Sig: Take 1 capsule by mouth 4 (Four) Times a Day.   • hydrocortisone (ANUSOL-HC) 25 MG suppository 24 suppository 1     Sig: Insert 1 suppository into the rectum 2 (Two) Times a Day As Needed for Hemorrhoids (rectal discomfort).   • ondansetron ODT (ZOFRAN-ODT) 4 MG disintegrating tablet 30 tablet 0     Sig: Place 1 tablet on the tongue Every 8 (Eight) Hours As Needed for Nausea or Vomiting.       Review and/or summary of lab tests, radiology, procedures, medications. Review and summary of old records and obtaining of history. The risks and benefits of my recommendations, as well as other treatment options were discussed with the patient today. Questions were answered.    Follow-up: Return in about 1 month (around 3/1/2023), or if symptoms worsen or fail to improve.     * Surgery not found *      This document has been electronically signed by Dane Leyva PA-C on 2023 18:39 CST      Results for orders placed or performed during the hospital encounter of 23   TISSUE EXAM, [facility] ([facility])    Specimen: A: Small Intestine, Duodenum; Tissue    B: Gastric, Antrum; Tissue    C: Esophagus; Tissue    D: Small Intestine, Ileum; Tissue    E: Large Intestine; Tissue   Result Value Ref Range    Reference Lab Report       [facility]  [address] [place] [address]  Phone: [phone] or [phone]  Fax: [phone]  Charles Ramos M.D., Medical Director    PATIENT NAME                           LABORATORY NO.  LESA SALCEDO.                  [id number]  [id number]                         AGE              SEX  SSN           CLIENT REF #  [facility]           22      2000  F    [id number]   [id number]    [place]                       REQUESTING M.D.     ATTENDING M.D.     COPY TO[address]                  "[place] [address]             JESSICA  DATE COLLECTED      DATE RECEIVED      DATE REPORTED  01/24/2023 01/25/2023 01/30/2023    DIAGNOSIS:  A.   SMALL BOWEL, BIOPSY:  No significant histologic abnormality  B.   ANTRUM, BIOPSY:  Active chronic gastritis  Positive for Helicobacter pylori  C.   GE JUNCTION:  Reactive gastric and squamous mucosa  D.    TERMINAL ILEUM BIOPSY:  No significant histologic abnormality  E.   COLON, BIOPSY:  No significant histologic abnormality    JBS/sm    COMMENT:  I ordered H. pylori immunohistochemical (IHC) stain on block  B1 because a typical inflammatory infiltrate was present, and organisms are not  seen on H&E staining.  H. pylori IHC stain is positive for Helicobacter pylori.  Control tissue stains as expected.    CLINICAL HISTORY:  Hemorrhage of anus and rectum, lower abdominal pain, change in bowel habits    SPECIMENS RECEIVED:  A.  SMALL BOWEL, BIOPSY  B.  ANTRUM, BIOPSY  C.  GE JUNCTION  D.  TERMINAL ILEUM BIOPSY  E.  COLON, BIOPSY    MICROSCOPIC DESCRIPTION:  Tissue blocks are prepared and slides are examined microscopically on all  specimens. See diagnosis for details.    The internal and external (both positive and negative) controls reacted  appropriately. Some of our immunohistochemical and in situ hybridization  studies are performed as analyte specific reagents. The following  statement  applies to those tests: This test was developed, and its performance  characteristics determined by [facility]. It has not been  cleared or approved by the [place] Food and Drug Administration. However, the  FDA has determined that approval and clearance are not necessary.    Professional interpretation rendered by Cornelio Cage M.D. at [facility],  [facility], [address] [place] [address].    GROSS DESCRIPTION:  A.  Specimen is received in 1 formalin filled container labeled \"small bowel  biopsy\" and " "consists of 3 portions of tan soft tissue measuring 0.4 x 0.4 x  0.2 cm in aggregate.  Submitted entirely in 1 cassette.  MTH  B.  Specimen is received in 1 formalin filled container labeled \"antrum  biopsy\" and consists of 2 portions of tan soft tissue measuring 0.4 x 0.4 x  0.2 cm in aggregate.  Submitted entirely in 1 cassette.  C.  Specimen is received in 1 formalin filled container labeled \"EG junction  biopsy\" and consists of a single portion of gray  soft tissue measuring 0.3 x  0.2 x 0.2 cm.  Submitted entirely in 1 cassette.  D.  Specimen is received in 1 formalin filled container labeled \"TI biopsy\" and  consists of 2 portions of tan soft tissue measuring 0.5 x 0.3 x 0.2 cm.  Submitted entirely in one cassette the smallest portion may not survive  processing.  E.  Specimen is received in 1 formalin filled container labeled \"colonic  mucosa biopsy\" and consists of 2 portions of tan soft tissue measuring 0.3  x 0.3 x 0.2 cm in aggregate.  Submitted entirely in 1 cassette.    REVIEWED, DIAGNOSED AND ELECTRONICALLY  SIGNED BY:    Cornelio Cage M.D.  CPT CODES:  88305x5, 50707     Results for orders placed or performed in visit on 12/13/22   CBC Auto Differential    Specimen: Blood   Result Value Ref Range    WBC 5.80 3.40 - 10.80 10*3/mm3    RBC 4.62 3.77 - 5.28 10*6/mm3    Hemoglobin 13.2 12.0 - 15.9 g/dL    Hematocrit 38.5 34.0 - 46.6 %    MCV 83.3 79.0 - 97.0 fL    MCH 28.6 26.6 - 33.0 pg    MCHC 34.3 31.5 - 35.7 g/dL    RDW 13.5 12.3 - 15.4 %    RDW-SD 41.3 37.0 - 54.0 fl    MPV 10.6 6.0 - 12.0 fL    Platelets 378 140 - 450 10*3/mm3    Neutrophil % 55.5 42.7 - 76.0 %    Lymphocyte % 35.5 19.6 - 45.3 %    Monocyte % 6.2 5.0 - 12.0 %    Eosinophil % 1.9 0.3 - 6.2 %    Basophil % 0.7 0.0 - 1.5 %    Immature Grans % 0.2 0.0 - 0.5 %    Neutrophils, Absolute 3.22 1.70 - 7.00 10*3/mm3    Lymphocytes, Absolute 2.06 0.70 - 3.10 10*3/mm3    Monocytes, Absolute 0.36 0.10 - 0.90 10*3/mm3    Eosinophils, Absolute " 0.11 0.00 - 0.40 10*3/mm3    Basophils, Absolute 0.04 0.00 - 0.20 10*3/mm3    Immature Grans, Absolute 0.01 0.00 - 0.05 10*3/mm3    nRBC 0.2 0.0 - 0.2 /100 WBC   Hepatitis C Antibody    Specimen: Blood   Result Value Ref Range    Hepatitis C Ab Non-Reactive Non-Reactive   TSH    Specimen: Blood   Result Value Ref Range    TSH 0.811 0.270 - 4.200 uIU/mL   Hemoglobin A1c    Specimen: Blood   Result Value Ref Range    Hemoglobin A1C 5.00 4.80 - 5.60 %   Vitamin B12    Specimen: Blood   Result Value Ref Range    Vitamin B-12 257 211 - 946 pg/mL   Lipid Panel    Specimen: Blood   Result Value Ref Range    Total Cholesterol 171 0 - 200 mg/dL    Triglycerides 32 0 - 150 mg/dL    HDL Cholesterol 60 40 - 60 mg/dL    LDL Cholesterol  104 (H) 0 - 100 mg/dL    VLDL Cholesterol 7 5 - 40 mg/dL    LDL/HDL Ratio 1.74    Comprehensive Metabolic Panel    Specimen: Blood   Result Value Ref Range    Glucose 95 65 - 99 mg/dL    BUN 12 6 - 20 mg/dL    Creatinine 0.73 0.57 - 1.00 mg/dL    Sodium 137 136 - 145 mmol/L    Potassium 4.8 3.5 - 5.2 mmol/L    Chloride 104 98 - 107 mmol/L    CO2 27.1 22.0 - 29.0 mmol/L    Calcium 9.2 8.6 - 10.5 mg/dL    Total Protein 7.1 6.0 - 8.5 g/dL    Albumin 4.60 3.50 - 5.20 g/dL    ALT (SGPT) 6 1 - 33 U/L    AST (SGOT) 15 1 - 32 U/L    Alkaline Phosphatase 57 39 - 117 U/L    Total Bilirubin 0.2 0.0 - 1.2 mg/dL    Globulin 2.5 gm/dL    A/G Ratio 1.8 g/dL    BUN/Creatinine Ratio 16.4 7.0 - 25.0    Anion Gap 5.9 5.0 - 15.0 mmol/L    eGFR 119.4 >60.0 mL/min/1.73   Results for orders placed or performed during the hospital encounter of 08/17/22   COVID-19 and FLU A/B PCR - Swab, Nasopharynx    Specimen: Nasopharynx; Swab   Result Value Ref Range    COVID19 Detected (C) Not Detected - Ref. Range    Influenza A PCR Not Detected Not Detected    Influenza B PCR Not Detected Not Detected   TISSUE EXAM, [facility] ([facility])    Specimen: Large Intestine, Appendix; Tissue   Result Value Ref Range    Reference Lab  "Report       [facility]  [address] [place] [address]  Phone: [phone] or [phone]  Fax: [phone]  Charles Ramos M.D., Medical Director    PATIENT NAME                           LABORATORY NO.  LESA SALCEDO.                  [id number]  [id number]                         AGE              SEX  N           CLIENT REF #  [facility]           21      2000  F    [id number]   [id number]    [place]                       REQUESTING M.D.     ATTENDING MMendezD.     COPY TO.  [address] [place] [address]                 [place] [address]             DATE COLLECTED      DATE RECEIVED      DATE REPORTED  2022    DIAGNOSIS:  APPENDIX:  Acute appendicitis    JBS/sdl    CLINICAL HISTORY:  Epigastric pain    SPECIMENS RECEIVED:  APPENDIX    MICROSCOPIC DESCRIPTION:  Tissue blocks are prepared and slides are examined microscopically on all  specimens. See diagnosis for  details.    Professional interpretation rendered by Cornelio Cage M.D. at [facility], [address] [place] [address].    GROSS DESCRIPTION:  Received in formalin labeled with \"large intestine, appendix\" is a 6.4 cm in  length, 4.0 cm in diameter vermiform appendix with an attached yellow  lobulated, intact, up to 1.4 cm mesoappendix.  The serosal surface is tan-red,  focally hemorrhagic, smooth and glistening with a moderate amount of tan-  yellow exudate located on the entire length of the appendix.  The staple proximal  margin is removed and inked blue.  The specimen is serially sectioned to reveal  a 0.4 to 0.6 cm lumen with an up to 0.3 cm wall thickness.  A fecalith is not  appreciated.  No masses or lesions are identified.  Representative sections are  submitted in A1.  [facility]    Section code:  A1: Proximal margin, en face, half distal tip, body    REVIEWED, DIAGNOSED AND ELECTRONICALLY  SIGNED " BY:    Cornelio Cage M.D.  CPT CODES:  31446     Urine Drug Screen - Urine, Clean Catch    Specimen: Urine, Clean Catch   Result Value Ref Range    THC, Screen, Urine Positive (A) Negative    Phencyclidine (PCP), Urine Negative Negative    Cocaine Screen, Urine Negative Negative    Methamphetamine, Ur Negative Negative    Opiate Screen Positive (A) Negative    Amphetamine Screen, Urine Negative Negative    Benzodiazepine Screen, Urine Negative Negative    Tricyclic Antidepressants Screen Negative Negative    Methadone Screen, Urine Negative Negative    Barbiturates Screen, Urine Negative Negative    Oxycodone Screen, Urine Negative Negative    Propoxyphene Screen Negative Negative    Buprenorphine, Screen, Urine Negative Negative   Pregnancy, Urine - Urine, Clean Catch    Specimen: Urine, Clean Catch   Result Value Ref Range    HCG, Urine QL Negative Negative   Results for orders placed or performed during the hospital encounter of 08/16/22   Gold Top - SST   Result Value Ref Range    Extra Tube Hold for add-ons.    Green Top (Gel)   Result Value Ref Range    Extra Tube Hold for add-ons.    Urinalysis, Microscopic Only - Urine, Clean Catch    Specimen: Urine, Clean Catch   Result Value Ref Range    RBC, UA None Seen None Seen /HPF    WBC, UA 13-20 (A) None Seen, 0-2, 3-5 /HPF    Bacteria, UA 3+ (A) None Seen /HPF    Squamous Epithelial Cells, UA 13-20 (A) None Seen, 0-2 /HPF    Hyaline Casts, UA None Seen None Seen /LPF    Methodology Manual Light Microscopy    Urinalysis With Microscopic If Indicated (No Culture) - Urine, Clean Catch    Specimen: Urine, Clean Catch   Result Value Ref Range    Color, UA Yellow Yellow, Straw, Dark Yellow, Yudith    Appearance, UA Cloudy (A) Clear    pH, UA 6.5 5.0 - 9.0    Specific [place], UA 1.026 1.003 - 1.030    Glucose, UA Negative Negative    Ketones, UA 80 mg/dL (3+) (A) Negative    Bilirubin, UA Negative Negative    Blood, UA Negative Negative    Protein, UA Trace (A)  Negative    Leuk Esterase, UA Trace (A) Negative    Nitrite, UA Negative Negative    Urobilinogen, UA 1.0 E.U./dL 0.2 - 1.0 E.U./dL   CBC Auto Differential    Specimen: Blood   Result Value Ref Range    WBC 7.71 3.40 - 10.80 10*3/mm3    RBC 4.71 3.77 - 5.28 10*6/mm3    Hemoglobin 12.9 12.0 - 15.9 g/dL    Hematocrit 39.4 34.0 - 46.6 %    MCV 83.7 79.0 - 97.0 fL    MCH 27.4 26.6 - 33.0 pg    MCHC 32.7 31.5 - 35.7 g/dL    RDW 13.6 12.3 - 15.4 %    RDW-SD 42.1 37.0 - 54.0 fl    MPV 10.1 6.0 - 12.0 fL    Platelets 185 140 - 450 10*3/mm3    Neutrophil % 80.9 (H) 42.7 - 76.0 %    Lymphocyte % 13.4 (L) 19.6 - 45.3 %    Monocyte % 4.8 (L) 5.0 - 12.0 %    Eosinophil % 0.1 (L) 0.3 - 6.2 %    Basophil % 0.3 0.0 - 1.5 %    Immature Grans % 0.5 0.0 - 0.5 %    Neutrophils, Absolute 6.24 1.70 - 7.00 10*3/mm3    Lymphocytes, Absolute 1.03 0.70 - 3.10 10*3/mm3    Monocytes, Absolute 0.37 0.10 - 0.90 10*3/mm3    Eosinophils, Absolute 0.01 0.00 - 0.40 10*3/mm3    Basophils, Absolute 0.02 0.00 - 0.20 10*3/mm3    Immature Grans, Absolute 0.04 0.00 - 0.05 10*3/mm3    nRBC 0.0 0.0 - 0.2 /100 WBC     *Note: Due to a large number of results and/or encounters for the requested time period, some results have not been displayed. A complete set of results can be found in Results Review.

## 2023-02-07 ENCOUNTER — OFFICE VISIT (OUTPATIENT)
Dept: PODIATRY | Facility: CLINIC | Age: 23
End: 2023-02-07
Payer: MEDICAID

## 2023-02-07 VITALS — WEIGHT: 182 LBS | HEIGHT: 60 IN | BODY MASS INDEX: 35.73 KG/M2

## 2023-02-07 DIAGNOSIS — S92.919A CLOSED NONDISPLACED FRACTURE OF DISTAL PHALANX OF TOE: Primary | ICD-10-CM

## 2023-02-07 PROCEDURE — 99024 POSTOP FOLLOW-UP VISIT: CPT | Performed by: NURSE PRACTITIONER

## 2023-02-07 NOTE — PROGRESS NOTES
Chandler Phelan  2000  22 y.o. female      2023      Chief Complaint   Patient presents with   • Right Foot - Pain, Follow-up       History of Present Illness    Chandler Phelan is a 22 y.o.female presents to the clinic today with chief complaint of a right 4th toe fracture. Patient hit her toe on a chair on 2023. Xrays obtained on 2023        Past Medical History:   Diagnosis Date   • Back pain          Past Surgical History:   Procedure Laterality Date   • APPENDECTOMY N/A 2022    Procedure: APPENDECTOMY LAPAROSCOPIC;  Surgeon: Dejuan Crawford MD;  Location: [facility] OR;  Service: General;  Laterality: N/A;   • COLONOSCOPY N/A 2023    Procedure: COLONOSCOPY--look TI, bx;  Surgeon: Bautista Vogel MD;  Location: [facility] ENDOSCOPY;  Service: Gastroenterology;  Laterality: N/A;   • ENDOSCOPY N/A 2023    Procedure: ESOPHAGOGASTRODUODENOSCOPY;  Surgeon: Bautista Vogel MD;  Location: [facility] ENDOSCOPY;  Service: Gastroenterology;  Laterality: N/A;         Family History   Problem Relation Age of Onset   • No Known Problems Mother    • No Known Problems Father    • No Known Problems Sister    • No Known Problems Sister    • No Known Problems Sister    • No Known Problems Brother        Allergies   Allergen Reactions   • Penicillins Anaphylaxis and Unknown - High Severity       Social History     Socioeconomic History   • Marital status:    Tobacco Use   • Smoking status: Former     Types: Cigarettes     Quit date: 2022     Years since quittin.7   • Smokeless tobacco: Never   Vaping Use   • Vaping Use: Never used   Substance and Sexual Activity   • Alcohol use: Not Currently   • Drug use: Yes     Types: Marijuana   • Sexual activity: Defer         Current Outpatient Medications   Medication Sig Dispense Refill   • Bismuth Subsalicylate 525 MG/15ML suspension Take 15 mL by mouth 4 (Four) Times a Day. 840 mL 0   • hydrocortisone (ANUSOL-HC) 25 MG suppository  "Insert 1 suppository into the rectum 2 (Two) Times a Day As Needed for Hemorrhoids (rectal discomfort). 24 suppository 1   • metroNIDAZOLE (FLAGYL) 500 MG tablet Take 1 tablet by mouth 2 (Two) Times a Day. 20 tablet 0   • omeprazole (priLOSEC) 20 MG capsule Take 1 capsule by mouth 2 (Two) Times a Day. 60 capsule 5   • tetracycline (ACHROMYCIN,SUMYCIN) 500 MG capsule Take 1 capsule by mouth 4 (Four) Times a Day. 56 capsule 0     No current facility-administered medications for this visit.       Review of Systems   Constitutional: Negative.    HENT: Negative.    Eyes: Negative.    Respiratory: Negative.    Cardiovascular: Negative.    Gastrointestinal: Negative.    Endocrine: Negative.    Genitourinary: Negative.    Musculoskeletal:        Foot pain   Skin: Negative.    Allergic/Immunologic: Negative.    Neurological: Negative.    Hematological: Negative.    Psychiatric/Behavioral: Negative.          OBJECTIVE    Ht 152.4 cm (60\")   Wt 82.6 kg (182 lb)   LMP 01/18/2023 (Exact Date)   BMI 35.54 kg/m²     Physical Exam  Vitals reviewed.   Constitutional:       Appearance: Normal appearance. She is well-developed.   HENT:      Head: Normocephalic and atraumatic.   Neck:      Trachea: Trachea and phonation normal.   Cardiovascular:      Pulses:           Dorsalis pedis pulses are 2+ on the right side and 2+ on the left side.        Posterior tibial pulses are 2+ on the right side and 2+ on the left side.   Pulmonary:      Effort: Pulmonary effort is normal. No respiratory distress.   Abdominal:      General: There is no distension.      Palpations: Abdomen is soft.   Musculoskeletal:        Feet:    Feet:      Right foot:      Skin integrity: Skin integrity normal.      Toenail Condition: Right toenails are normal.      Left foot:      Skin integrity: Skin integrity normal.      Toenail Condition: Left toenails are normal.   Skin:     General: Skin is warm and dry.   Neurological:      Mental Status: She is alert and " oriented to person, place, and time.      GCS: GCS eye subscore is 4. GCS verbal subscore is 5. GCS motor subscore is 6.   Psychiatric:         Speech: Speech normal.         Behavior: Behavior normal. Behavior is cooperative.         Thought Content: Thought content normal.         Judgment: Judgment normal.                Procedures        ASSESSMENT AND PLAN    Diagnoses and all orders for this visit:    1. Closed nondisplaced fracture of distal phalanx of toe (Primary)  -     XR Foot 3+ View Right        Pain has resolved, return as needed           This document has been electronically signed by Anjum HOU, FNP-C, ONP-C on February 7, 2023 14:04 CST

## 2023-02-10 ENCOUNTER — TELEPHONE (OUTPATIENT)
Dept: GASTROENTEROLOGY | Facility: CLINIC | Age: 23
End: 2023-02-10
Payer: MEDICAID

## 2023-02-10 RX ORDER — ONDANSETRON 4 MG/1
4 TABLET, ORALLY DISINTEGRATING ORAL EVERY 8 HOURS PRN
Qty: 30 TABLET | Refills: 0 | Status: SHIPPED | OUTPATIENT
Start: 2023-02-10

## 2023-02-10 NOTE — TELEPHONE ENCOUNTER
Spoke to patient informed her that Zofran medication was sent to [facility] pharmacy per Dane Leyva. Patient acknowledged

## 2023-02-10 NOTE — TELEPHONE ENCOUNTER
----- Message from Dane Leyva PA-C sent at 2/10/2023  2:44 PM CST -----  [facility]  ----- Message -----  From: Paulina Ontiveros  Sent: 2/10/2023   1:45 PM CST  To: Dane Leyva PA-C    Patient states that she is taking medication with food and would like you to call in some nausea medication in for her.

## 2023-02-20 ENCOUNTER — TELEPHONE (OUTPATIENT)
Dept: GASTROENTEROLOGY | Facility: CLINIC | Age: 23
End: 2023-02-20
Payer: MEDICAID

## 2023-02-20 NOTE — TELEPHONE ENCOUNTER
"Patient was notified and will bring in stool specimen in 6 weeks.      ----- Message from Dane Leyva PA-C sent at 2/20/2023  4:41 PM CST -----  Regarding: RE: Antibiotic  We can wait 6 weeks and retest with a stool and see if what she took was enough to clear it.  ----- Message -----  From: Gisselle Brice MA  Sent: 2/20/2023   4:36 PM CST  To: Dane Leyva PA-C  Subject: RE: Antibiotic                                   She said that she was able to take the Medication 1 week as was directed, then after that, the problems started. She did, however, finish the Flagyl.....  After problems started, she took the medication, but in \"Spurts\" .... Hope this makes sense....  ----- Message -----  From: Dane Leyva PA-C  Sent: 2/17/2023   1:36 PM CST  To: Gisselle Brice MA  Subject: RE: Antibiotic                                   Stop taking the tetracycline. How may days does she think she was able to take all the medications as prescribed?  ----- Message -----  From: Gisselle Brice MA  Sent: 2/17/2023   8:48 AM CST  To: Dane Leyva PA-C  Subject: Antibiotic                                       Patient has called back this morning (Regarding Tetracycline). She is still vomiting, even with Zofran. She has noticed hives this morning. Please advise....            "

## 2023-02-21 ENCOUNTER — TELEPHONE (OUTPATIENT)
Dept: GASTROENTEROLOGY | Facility: CLINIC | Age: 23
End: 2023-02-21
Payer: MEDICAID

## 2023-02-21 NOTE — TELEPHONE ENCOUNTER
"Patient was notified to continue Omeprazole, she voiced understanding.      ----- Message from Dane Leyva PA-C sent at 2/20/2023  7:08 PM CST -----  Regarding: RE: Antibiotic  Have her stay on the omeprazole. I gave her refills.  ----- Message -----  From: Gisselle Brice MA  Sent: 2/20/2023   5:06 PM CST  To: Dane Leyva PA-C  Subject: RE: Antibiotic                                   She said that will be good. She is agreeable. She said that she is still having pain, but no blood.  ----- Message -----  From: Dane Leyva PA-C  Sent: 2/20/2023   4:41 PM CST  To: Gisselle Brice MA  Subject: RE: Antibiotic                                   We can wait 6 weeks and retest with a stool and see if what she took was enough to clear it.  ----- Message -----  From: Gisselle Brice MA  Sent: 2/20/2023   4:36 PM CST  To: Dane Leyva PA-C  Subject: RE: Antibiotic                                   She said that she was able to take the Medication 1 week as was directed, then after that, the problems started. She did, however, finish the Flagyl.....  After problems started, she took the medication, but in \"Spurts\" .... Hope this makes sense....  ----- Message -----  From: Dane Leyva PA-C  Sent: 2/17/2023   1:36 PM CST  To: Gisselle Brice MA  Subject: RE: Antibiotic                                   Stop taking the tetracycline. How may days does she think she was able to take all the medications as prescribed?  ----- Message -----  From: Gisselle Brice MA  Sent: 2/17/2023   8:48 AM CST  To: Dane Leyva PA-C  Subject: Antibiotic                                       Patient has called back this morning (Regarding Tetracycline). She is still vomiting, even with Zofran. She has noticed hives this morning. Please advise....                "

## 2023-05-04 ENCOUNTER — OFFICE VISIT (OUTPATIENT)
Dept: GASTROENTEROLOGY | Facility: CLINIC | Age: 23
End: 2023-05-04
Payer: MEDICAID

## 2023-05-04 VITALS
OXYGEN SATURATION: 99 % | HEART RATE: 65 BPM | SYSTOLIC BLOOD PRESSURE: 94 MMHG | BODY MASS INDEX: 36.48 KG/M2 | HEIGHT: 60 IN | WEIGHT: 185.8 LBS | DIASTOLIC BLOOD PRESSURE: 62 MMHG

## 2023-05-04 DIAGNOSIS — K62.89 ANAL OR RECTAL PAIN: ICD-10-CM

## 2023-05-04 DIAGNOSIS — A04.8 H. PYLORI INFECTION: ICD-10-CM

## 2023-05-04 DIAGNOSIS — K21.00 GASTROESOPHAGEAL REFLUX DISEASE WITH ESOPHAGITIS WITHOUT HEMORRHAGE: ICD-10-CM

## 2023-05-04 DIAGNOSIS — R10.10 PAIN OF UPPER ABDOMEN: Primary | ICD-10-CM

## 2023-05-04 DIAGNOSIS — K59.00 CONSTIPATION, UNSPECIFIED CONSTIPATION TYPE: ICD-10-CM

## 2023-05-04 PROCEDURE — 1159F MED LIST DOCD IN RCRD: CPT | Performed by: PHYSICIAN ASSISTANT

## 2023-05-04 PROCEDURE — 99213 OFFICE O/P EST LOW 20 MIN: CPT | Performed by: PHYSICIAN ASSISTANT

## 2023-05-04 PROCEDURE — 1160F RVW MEDS BY RX/DR IN RCRD: CPT | Performed by: PHYSICIAN ASSISTANT

## 2023-05-04 RX ORDER — FAMOTIDINE 40 MG/1
40 TABLET, FILM COATED ORAL 2 TIMES DAILY PRN
Qty: 60 TABLET | Refills: 1 | Status: SHIPPED | OUTPATIENT
Start: 2023-05-04

## 2023-05-04 NOTE — PROGRESS NOTES
Chief Complaint   Patient presents with   • Follow-up     3 Month   • Hemorrhoids   • H. Pylori   • Constipation       ENDO PROCEDURE ORDERED:    Subjective    Chandler Phelan is a 22 y.o. female. she is here today for follow-up.    History of Present Illness    Patient seen on a recheck of her GERD, abdominal pain, constipation, hemorrhoids. Last seen 02/01/2023 was given Anusol and a low dose Linzess. She did complete at least 7-10 days of the treatment for the H. Pylori. She states it made her feel very sick. She is doing some better with the heartburn, no dysphagia, the bleeding is doing a little bit better. She is still having some constipation. .She does think the Linzess helped. Weight is up 3 pounds since last visit. Last EGD/colonoscopy 01/24/2023 showed a positive H. Pylori, grade 1 esophagitis, gastritis and hemorrhoids.     A/P: Patient with H. Pylori gastritis. She may have taken enough to clear the infection. At 6 weeks we will plan repeat stool for H. Pylori. If positive will try to do a one-day treatment although she is allergic to Penicillin. Will try giving her Pepcid 40 mg daily for the gastritis and will give her the Linzess 72 mcg. Will plan follow-up in 6 weeks, sooner if needed, further pending clinical course and the results of the above.         The following portions of the patient's history were reviewed and updated as appropriate:   Past Medical History:   Diagnosis Date   • Back pain      Past Surgical History:   Procedure Laterality Date   • APPENDECTOMY N/A 8/17/2022    Procedure: APPENDECTOMY LAPAROSCOPIC;  Surgeon: Dejuan Crawford MD;  Location: [facility] OR;  Service: General;  Laterality: N/A;   • COLONOSCOPY N/A 1/24/2023    Procedure: COLONOSCOPY--look TI, bx;  Surgeon: Bautista Vogel MD;  Location: [facility] ENDOSCOPY;  Service: Gastroenterology;  Laterality: N/A;   • ENDOSCOPY N/A 1/24/2023    Procedure: ESOPHAGOGASTRODUODENOSCOPY;  Surgeon: Bautista Vogel MD;  Location:   [facility];  Service: Gastroenterology;  Laterality: N/A;     Family History   Problem Relation Age of Onset   • No Known Problems Mother    • No Known Problems Father    • No Known Problems Sister    • No Known Problems Sister    • No Known Problems Sister    • No Known Problems Brother      OB History    No obstetric history on file.       Allergies   Allergen Reactions   • Penicillins Anaphylaxis and Unknown - High Severity     Social History     Socioeconomic History   • Marital status:    Tobacco Use   • Smoking status: Former     Types: Cigarettes     Quit date: 2022     Years since quittin.0   • Smokeless tobacco: Never   Vaping Use   • Vaping Use: Never used   Substance and Sexual Activity   • Alcohol use: Not Currently   • Drug use: Yes     Types: Marijuana   • Sexual activity: Defer     Current Medications:  Prior to Admission medications    Medication Sig Start Date End Date Taking? Authorizing Provider   ondansetron ODT (ZOFRAN-ODT) 4 MG disintegrating tablet Place 1 tablet on the tongue Every 8 (Eight) Hours As Needed for Nausea or Vomiting. 2/10/23  Yes Dane Leyva PA-C   Bismuth Subsalicylate 525 MG/15ML suspension Take 15 mL by mouth 4 (Four) Times a Day.  Patient not taking: Reported on 2023  Dane Leyva PA-C   hydrocortisone (ANUSOL-HC) 25 MG suppository Insert 1 suppository into the rectum 2 (Two) Times a Day As Needed for Hemorrhoids (rectal discomfort).  Patient not taking: Reported on 2023  Dane Leyva PA-C   metroNIDAZOLE (FLAGYL) 500 MG tablet Take 1 tablet by mouth 2 (Two) Times a Day.  Patient not taking: Reported on 2023  Dane Leyva PA-C   omeprazole (priLOSEC) 20 MG capsule Take 1 capsule by mouth 2 (Two) Times a Day.  Patient not taking: Reported on 2023  Dane Leyva PA-C   tetracycline (ACHROMYCIN,SUMYCIN) 500 MG capsule Take 1 capsule by mouth 4 (Four) Times a  "Day.  Patient not taking: Reported on 5/4/2023 2/1/23 5/4/23  Dane Leyva PA-C     Review of Systems  Review of Systems       Objective    BP 94/62 (BP Location: Left arm, Patient Position: Sitting)   Pulse 65   Ht 152.4 cm (60\")   Wt 84.3 kg (185 lb 12.8 oz)   SpO2 99%   BMI 36.29 kg/m²   Physical Exam  Vitals and nursing note reviewed.   Constitutional:       General: She is not in acute distress.     Appearance: She is well-developed.      Comments: AA   HENT:      Head: Normocephalic and atraumatic.   Eyes:      Pupils: Pupils are equal, round, and reactive to light.   Cardiovascular:      Rate and Rhythm: Normal rate and regular rhythm.      Heart sounds: Normal heart sounds.   Pulmonary:      Effort: Pulmonary effort is normal.      Breath sounds: Normal breath sounds.   Abdominal:      General: Bowel sounds are normal. There is no distension or abdominal bruit.      Palpations: Abdomen is soft. Abdomen is not rigid. There is no shifting dullness or mass.      Tenderness: There is abdominal tenderness. There is no guarding or rebound.      Hernia: No hernia is present. There is no hernia in the ventral area.   Musculoskeletal:         General: Normal range of motion.      Cervical back: Normal range of motion.   Skin:     General: Skin is warm and dry.   Neurological:      Mental Status: She is alert and oriented to person, place, and time.   Psychiatric:         Behavior: Behavior normal.         Thought Content: Thought content normal.         Judgment: Judgment normal.       Assessment & Plan      1. Pain of upper abdomen    2. Anal or rectal pain    3. Constipation, unspecified constipation type    4. Gastroesophageal reflux disease with esophagitis without hemorrhage    5. H. pylori infection    .   Diagnoses and all orders for this visit:    1. Pain of upper abdomen (Primary)    2. Anal or rectal pain    3. Constipation, unspecified constipation type    4. Gastroesophageal reflux disease with " esophagitis without hemorrhage    5. H. pylori infection  -     H. Pylori Antigen, Stool - Stool, Per Rectum    Other orders  -     linaclotide (Linzess) 72 MCG capsule capsule; Take 1 capsule by mouth Every Morning Before Breakfast.  Dispense: 30 capsule; Refill: 2  -     famotidine (Pepcid) 40 MG tablet; Take 1 tablet by mouth 2 (Two) Times a Day As Needed for Heartburn.  Dispense: 60 tablet; Refill: 1        Orders placed during this encounter include:  Orders Placed This Encounter   Procedures   • H. Pylori Antigen, Stool - Stool, Per Rectum     Order Specific Question:   Release to patient     Answer:   Routine Release       Medications prescribed:  New Medications Ordered This Visit   Medications   • linaclotide (Linzess) 72 MCG capsule capsule     Sig: Take 1 capsule by mouth Every Morning Before Breakfast.     Dispense:  30 capsule     Refill:  2   • famotidine (Pepcid) 40 MG tablet     Sig: Take 1 tablet by mouth 2 (Two) Times a Day As Needed for Heartburn.     Dispense:  60 tablet     Refill:  1     Discontinued Medications       Reason for Discontinue     Bismuth Subsalicylate 525 MG/15ML suspension    *Therapy completed     hydrocortisone (ANUSOL-HC) 25 MG suppository    *Therapy completed     metroNIDAZOLE (FLAGYL) 500 MG tablet    *Therapy completed     omeprazole (priLOSEC) 20 MG capsule    *Therapy completed     tetracycline (ACHROMYCIN,SUMYCIN) 500 MG capsule    *Therapy completed        Requested Prescriptions     Signed Prescriptions Disp Refills   • linaclotide (Linzess) 72 MCG capsule capsule 30 capsule 2     Sig: Take 1 capsule by mouth Every Morning Before Breakfast.   • famotidine (Pepcid) 40 MG tablet 60 tablet 1     Sig: Take 1 tablet by mouth 2 (Two) Times a Day As Needed for Heartburn.       Review and/or summary of lab tests, radiology, procedures, medications. Review and summary of old records and obtaining of history. The risks and benefits of my recommendations, as well as other  treatment options were discussed with the patient today. Questions were answered.    Follow-up: Return in about 6 weeks (around 6/15/2023), or if symptoms worsen or fail to improve, for lab prior.     * Surgery not found *      This document has been electronically signed by Dane Leyva PA-C on May 18, 2023 17:53 CDT      Results for orders placed or performed during the hospital encounter of 23   TISSUE EXAM, [facility] ([facility])    Specimen: A: Small Intestine, Duodenum; Tissue    B: Gastric, Antrum; Tissue    C: Esophagus; Tissue    D: Small Intestine, Ileum; Tissue    E: Large Intestine; Tissue   Result Value Ref Range    Reference Lab Report       [facility]  [address] [place] [address]  Phone: [phone] or [phone]  Fax: [phone]  Charles Ramos M.D., Medical Director    PATIENT NAME                           LABORATORY NO.  LESA SALCEDO.                  [id number]  [id number]                         AGE              SEX  N           CLIENT REF #  [facility]           22      2000  F    [id number]   [id number]    [place]                       REQUESTING M.D.     ATTENDING M.D.     COPY TO.  [address] [place] [address]  DATE COLLECTED      DATE RECEIVED      DATE REPORTED  2023    DIAGNOSIS:  A.   SMALL BOWEL, BIOPSY:  No significant histologic abnormality  B.   ANTRUM, BIOPSY:  Active chronic gastritis  Positive for Helicobacter pylori  C.   GE JUNCTION:  Reactive gastric and squamous mucosa  D.    TERMINAL ILEUM BIOPSY:  No significant histologic abnormality  E.   COLON, BIOPSY:  No significant histologic abnormality    JBS/sm    COMMENT:  I ordered H. pylori immunohistochemical (IHC) stain on block  B1 because a typical inflammatory infiltrate was present, and  "organisms are not  seen on H&E staining.  H. pylori IHC stain is positive for Helicobacter pylori.  Control tissue stains as expected.    CLINICAL HISTORY:  Hemorrhage of anus and rectum, lower abdominal pain, change in bowel habits    SPECIMENS RECEIVED:  A.  SMALL BOWEL, BIOPSY  B.  ANTRUM, BIOPSY  C.  GE JUNCTION  D.  TERMINAL ILEUM BIOPSY  E.  COLON, BIOPSY    MICROSCOPIC DESCRIPTION:  Tissue blocks are prepared and slides are examined microscopically on all  specimens. See diagnosis for details.    The internal and external (both positive and negative) controls reacted  appropriately. Some of our immunohistochemical and in situ hybridization  studies are performed as analyte specific reagents. The following  statement  applies to those tests: This test was developed, and its performance  characteristics determined by [facility]. It has not been  cleared or approved by the [place] Food and Drug Administration. However, the  FDA has determined that approval and clearance are not necessary.    Professional interpretation rendered by Cornelio Cage M.D. at [facility],  [facility], [address] [place] [address].    GROSS DESCRIPTION:  A.  Specimen is received in 1 formalin filled container labeled \"small bowel  biopsy\" and consists of 3 portions of tan soft tissue measuring 0.4 x 0.4 x  0.2 cm in aggregate.  Submitted entirely in 1 cassette.  MTH  B.  Specimen is received in 1 formalin filled container labeled \"antrum  biopsy\" and consists of 2 portions of tan soft tissue measuring 0.4 x 0.4 x  0.2 cm in aggregate.  Submitted entirely in 1 cassette.  C.  Specimen is received in 1 formalin filled container labeled \"EG junction  biopsy\" and consists of a single portion of gray  soft tissue measuring 0.3 x  0.2 x 0.2 cm.  Submitted entirely in 1 cassette.  D.  Specimen is received in 1 formalin filled container labeled \"TI biopsy\" and  consists of 2 portions of tan soft tissue measuring 0.5 x 0.3 x " "0.2 cm.  Submitted entirely in one cassette the smallest portion may not survive  processing.  E.  Specimen is received in 1 formalin filled container labeled \"colonic  mucosa biopsy\" and consists of 2 portions of tan soft tissue measuring 0.3  x 0.3 x 0.2 cm in aggregate.  Submitted entirely in 1 cassette.    REVIEWED, DIAGNOSED AND ELECTRONICALLY  SIGNED BY:    Cornelio Cage M.D.  CPT CODES:  88305x5, 12252     Results for orders placed or performed in visit on 12/13/22   CBC Auto Differential    Specimen: Blood   Result Value Ref Range    WBC 5.80 3.40 - 10.80 10*3/mm3    RBC 4.62 3.77 - 5.28 10*6/mm3    Hemoglobin 13.2 12.0 - 15.9 g/dL    Hematocrit 38.5 34.0 - 46.6 %    MCV 83.3 79.0 - 97.0 fL    MCH 28.6 26.6 - 33.0 pg    MCHC 34.3 31.5 - 35.7 g/dL    RDW 13.5 12.3 - 15.4 %    RDW-SD 41.3 37.0 - 54.0 fl    MPV 10.6 6.0 - 12.0 fL    Platelets 378 140 - 450 10*3/mm3    Neutrophil % 55.5 42.7 - 76.0 %    Lymphocyte % 35.5 19.6 - 45.3 %    Monocyte % 6.2 5.0 - 12.0 %    Eosinophil % 1.9 0.3 - 6.2 %    Basophil % 0.7 0.0 - 1.5 %    Immature Grans % 0.2 0.0 - 0.5 %    Neutrophils, Absolute 3.22 1.70 - 7.00 10*3/mm3    Lymphocytes, Absolute 2.06 0.70 - 3.10 10*3/mm3    Monocytes, Absolute 0.36 0.10 - 0.90 10*3/mm3    Eosinophils, Absolute 0.11 0.00 - 0.40 10*3/mm3    Basophils, Absolute 0.04 0.00 - 0.20 10*3/mm3    Immature Grans, Absolute 0.01 0.00 - 0.05 10*3/mm3    nRBC 0.2 0.0 - 0.2 /100 WBC   Hepatitis C Antibody    Specimen: Blood   Result Value Ref Range    Hepatitis C Ab Non-Reactive Non-Reactive   TSH    Specimen: Blood   Result Value Ref Range    TSH 0.811 0.270 - 4.200 uIU/mL   Hemoglobin A1c    Specimen: Blood   Result Value Ref Range    Hemoglobin A1C 5.00 4.80 - 5.60 %   Vitamin B12    Specimen: Blood   Result Value Ref Range    Vitamin B-12 257 211 - 946 pg/mL   Lipid Panel    Specimen: Blood   Result Value Ref Range    Total Cholesterol 171 0 - 200 mg/dL    Triglycerides 32 0 - 150 mg/dL    HDL " Cholesterol 60 40 - 60 mg/dL    LDL Cholesterol  104 (H) 0 - 100 mg/dL    VLDL Cholesterol 7 5 - 40 mg/dL    LDL/HDL Ratio 1.74    Comprehensive Metabolic Panel    Specimen: Blood   Result Value Ref Range    Glucose 95 65 - 99 mg/dL    BUN 12 6 - 20 mg/dL    Creatinine 0.73 0.57 - 1.00 mg/dL    Sodium 137 136 - 145 mmol/L    Potassium 4.8 3.5 - 5.2 mmol/L    Chloride 104 98 - 107 mmol/L    CO2 27.1 22.0 - 29.0 mmol/L    Calcium 9.2 8.6 - 10.5 mg/dL    Total Protein 7.1 6.0 - 8.5 g/dL    Albumin 4.60 3.50 - 5.20 g/dL    ALT (SGPT) 6 1 - 33 U/L    AST (SGOT) 15 1 - 32 U/L    Alkaline Phosphatase 57 39 - 117 U/L    Total Bilirubin 0.2 0.0 - 1.2 mg/dL    Globulin 2.5 gm/dL    A/G Ratio 1.8 g/dL    BUN/Creatinine Ratio 16.4 7.0 - 25.0    Anion Gap 5.9 5.0 - 15.0 mmol/L    eGFR 119.4 >60.0 mL/min/1.73   Results for orders placed or performed during the hospital encounter of 22   COVID-19 and FLU A/B PCR - Swab, Nasopharynx    Specimen: Nasopharynx; Swab   Result Value Ref Range    COVID19 Detected (C) Not Detected - Ref. Range    Influenza A PCR Not Detected Not Detected    Influenza B PCR Not Detected Not Detected   TISSUE EXAM, [facility] ([facility])    Specimen: Large Intestine, Appendix; Tissue   Result Value Ref Range    Reference Lab Report       [facility]  [address] [place] [address]  Phone: [phone] or [phone]  Fax: [phone]  Charles Ramos M.D., Medical Director    PATIENT NAME                           LABORATORY NO.  LESA SALCEDO.                  [id number]  [id number]                         AGE              SEX  SSN           CLIENT REF #  [facility]           21      2000  F    [id number]   [id number]    [place]                       REQUESTING M.D.     ATTENDING M.D.     COPY TO[address] [address]             DATE COLLECTED      DATE RECEIVED       "DATE REPORTED  08/17/2022 08/18/2022 08/23/2022    DIAGNOSIS:  APPENDIX:  Acute appendicitis    JBS/sdl    CLINICAL HISTORY:  Epigastric pain    SPECIMENS RECEIVED:  APPENDIX    MICROSCOPIC DESCRIPTION:  Tissue blocks are prepared and slides are examined microscopically on all  specimens. See diagnosis for  details.    Professional interpretation rendered by Cornelio Cage M.D. at [facility],  [facility], [address] [place] [address].    GROSS DESCRIPTION:  Received in formalin labeled with \"large intestine, appendix\" is a 6.4 cm in  length, 4.0 cm in diameter vermiform appendix with an attached yellow  lobulated, intact, up to 1.4 cm mesoappendix.  The serosal surface is tan-red,  focally hemorrhagic, smooth and glistening with a moderate amount of tan-  yellow exudate located on the entire length of the appendix.  The staple proximal  margin is removed and inked blue.  The specimen is serially sectioned to reveal  a 0.4 to 0.6 cm lumen with an up to 0.3 cm wall thickness.  A fecalith is not  appreciated.  No masses or lesions are identified.  Representative sections are  submitted in A1.  [facility]    Section code:  A1: Proximal margin, en face, half distal tip, body    REVIEWED, DIAGNOSED AND ELECTRONICALLY  SIGNED BY:    Cornelio Cage M.D.  CPT CODES:  54844     Urine Drug Screen - Urine, Clean Catch    Specimen: Urine, Clean Catch   Result Value Ref Range    THC, Screen, Urine Positive (A) Negative    Phencyclidine (PCP), Urine Negative Negative    Cocaine Screen, Urine Negative Negative    Methamphetamine, Ur Negative Negative    Opiate Screen Positive (A) Negative    Amphetamine Screen, Urine Negative Negative    Benzodiazepine Screen, Urine Negative Negative    Tricyclic Antidepressants Screen Negative Negative    Methadone Screen, Urine Negative Negative    Barbiturates Screen, Urine Negative Negative    Oxycodone Screen, Urine Negative Negative    Propoxyphene Screen Negative " Negative    Buprenorphine, Screen, Urine Negative Negative   Pregnancy, Urine - Urine, Clean Catch    Specimen: Urine, Clean Catch   Result Value Ref Range    HCG, Urine QL Negative Negative   Results for orders placed or performed during the hospital encounter of 08/16/22   Gold Top - SST   Result Value Ref Range    Extra Tube Hold for add-ons.    Green Top (Gel)   Result Value Ref Range    Extra Tube Hold for add-ons.    Urinalysis, Microscopic Only - Urine, Clean Catch    Specimen: Urine, Clean Catch   Result Value Ref Range    RBC, UA None Seen None Seen /HPF    WBC, UA 13-20 (A) None Seen, 0-2, 3-5 /HPF    Bacteria, UA 3+ (A) None Seen /HPF    Squamous Epithelial Cells, UA 13-20 (A) None Seen, 0-2 /HPF    Hyaline Casts, UA None Seen None Seen /LPF    Methodology Manual Light Microscopy    Urinalysis With Microscopic If Indicated (No Culture) - Urine, Clean Catch    Specimen: Urine, Clean Catch   Result Value Ref Range    Color, UA Yellow Yellow, Straw, Dark Yellow, Yudith    Appearance, UA Cloudy (A) Clear    pH, UA 6.5 5.0 - 9.0    Specific [place], UA 1.026 1.003 - 1.030    Glucose, UA Negative Negative    Ketones, UA 80 mg/dL (3+) (A) Negative    Bilirubin, UA Negative Negative    Blood, UA Negative Negative    Protein, UA Trace (A) Negative    Leuk Esterase, UA Trace (A) Negative    Nitrite, UA Negative Negative    Urobilinogen, UA 1.0 E.U./dL 0.2 - 1.0 E.U./dL   CBC Auto Differential    Specimen: Blood   Result Value Ref Range    WBC 7.71 3.40 - 10.80 10*3/mm3    RBC 4.71 3.77 - 5.28 10*6/mm3    Hemoglobin 12.9 12.0 - 15.9 g/dL    Hematocrit 39.4 34.0 - 46.6 %    MCV 83.7 79.0 - 97.0 fL    MCH 27.4 26.6 - 33.0 pg    MCHC 32.7 31.5 - 35.7 g/dL    RDW 13.6 12.3 - 15.4 %    RDW-SD 42.1 37.0 - 54.0 fl    MPV 10.1 6.0 - 12.0 fL    Platelets 185 140 - 450 10*3/mm3    Neutrophil % 80.9 (H) 42.7 - 76.0 %    Lymphocyte % 13.4 (L) 19.6 - 45.3 %    Monocyte % 4.8 (L) 5.0 - 12.0 %    Eosinophil % 0.1 (L) 0.3 - 6.2 %     Basophil % 0.3 0.0 - 1.5 %    Immature Grans % 0.5 0.0 - 0.5 %    Neutrophils, Absolute 6.24 1.70 - 7.00 10*3/mm3    Lymphocytes, Absolute 1.03 0.70 - 3.10 10*3/mm3    Monocytes, Absolute 0.37 0.10 - 0.90 10*3/mm3    Eosinophils, Absolute 0.01 0.00 - 0.40 10*3/mm3    Basophils, Absolute 0.02 0.00 - 0.20 10*3/mm3    Immature Grans, Absolute 0.04 0.00 - 0.05 10*3/mm3    nRBC 0.0 0.0 - 0.2 /100 WBC     *Note: Due to a large number of results and/or encounters for the requested time period, some results have not been displayed. A complete set of results can be found in Results Review.

## 2023-06-14 ENCOUNTER — LAB (OUTPATIENT)
Dept: LAB | Facility: HOSPITAL | Age: 23
End: 2023-06-14
Payer: MEDICAID

## 2023-06-14 PROCEDURE — 87338 HPYLORI STOOL AG IA: CPT | Performed by: PHYSICIAN ASSISTANT

## 2023-06-15 ENCOUNTER — TELEPHONE (OUTPATIENT)
Dept: GASTROENTEROLOGY | Facility: CLINIC | Age: 23
End: 2023-06-15

## 2023-06-15 NOTE — TELEPHONE ENCOUNTER
Pt was called and notified that her linzess was approved and being filled at [facility]

## 2023-06-17 LAB — H PYLORI AG STL QL IA: NEGATIVE

## 2023-07-27 ENCOUNTER — OFFICE VISIT (OUTPATIENT)
Dept: GASTROENTEROLOGY | Facility: CLINIC | Age: 23
End: 2023-07-27
Payer: MEDICAID

## 2023-07-27 VITALS
DIASTOLIC BLOOD PRESSURE: 68 MMHG | HEIGHT: 60 IN | HEART RATE: 68 BPM | SYSTOLIC BLOOD PRESSURE: 113 MMHG | WEIGHT: 192.4 LBS | BODY MASS INDEX: 37.77 KG/M2

## 2023-07-27 DIAGNOSIS — K59.00 CONSTIPATION, UNSPECIFIED CONSTIPATION TYPE: ICD-10-CM

## 2023-07-27 DIAGNOSIS — K58.1 IRRITABLE BOWEL SYNDROME WITH CONSTIPATION: Primary | ICD-10-CM

## 2023-07-27 DIAGNOSIS — K21.00 GASTROESOPHAGEAL REFLUX DISEASE WITH ESOPHAGITIS WITHOUT HEMORRHAGE: ICD-10-CM

## 2023-07-27 PROCEDURE — 1159F MED LIST DOCD IN RCRD: CPT | Performed by: PHYSICIAN ASSISTANT

## 2023-07-27 PROCEDURE — 1160F RVW MEDS BY RX/DR IN RCRD: CPT | Performed by: PHYSICIAN ASSISTANT

## 2023-07-27 PROCEDURE — 99213 OFFICE O/P EST LOW 20 MIN: CPT | Performed by: PHYSICIAN ASSISTANT

## 2023-07-27 RX ORDER — HYDROXYZINE HYDROCHLORIDE 25 MG/1
1 TABLET, FILM COATED ORAL EVERY 6 HOURS
COMMUNITY
Start: 2023-07-12

## 2023-07-27 NOTE — PROGRESS NOTES
Chief Complaint   Patient presents with    Irritable Bowel Syndrome       ENDO PROCEDURE ORDERED:    Subjective    Chandler Phelan is a 22 y.o. female. she is here today for follow-up.    History of Present Illness    The patient is seen on a recheck of her IBS, GERD and abdominal pain.  Last seen 06/15/2023.  The patient had had a stool for H. pylori negative the day prior.  She was given a trial on Linzess 72 mcg.  She states she is taking it most days and it is doing well.  She denied blood or mucus in her stool.  Weight is up 6-1/2 pounds since last visit.  GERD is doing well on the Pepcid 40 mg.  Denied nausea or vomiting.  Last EGD/colonoscopy 01/24/2023 showed H. pylori positive, gastritis, esophagitis, and hemorrhoids.      Laboratories 07/11/2023 showed a negative vaginal panel. Hepatitis B surface antigen negative.  Hepatitis C antibody nonreactive.  HIV negative.  HSV IgG positive at 1.58.  RPR negative.  She had a negative Pap smear 07/13/2023.     ASSESSMENT AND PLAN:  Patient with chronic GERD and IBS-C.  Appears to be doing well on current regimen.  We will continue current medication.  We will plan followup in 3 months, sooner if needed.           The following portions of the patient's history were reviewed and updated as appropriate:   Past Medical History:   Diagnosis Date    Back pain      Past Surgical History:   Procedure Laterality Date    APPENDECTOMY N/A 8/17/2022    Procedure: APPENDECTOMY LAPAROSCOPIC;  Surgeon: Dejuan Crawford MD;  Location: [facility] OR;  Service: General;  Laterality: N/A;    COLONOSCOPY N/A 1/24/2023    Procedure: COLONOSCOPY--look TI, bx;  Surgeon: Bautista Vogel MD;  Location: [facility] ENDOSCOPY;  Service: Gastroenterology;  Laterality: N/A;    ENDOSCOPY N/A 1/24/2023    Procedure: ESOPHAGOGASTRODUODENOSCOPY;  Surgeon: Bautista Vogel MD;  Location: [facility] ENDOSCOPY;  Service: Gastroenterology;  Laterality: N/A;     Family History   Problem Relation Age of  "Onset    No Known Problems Mother     No Known Problems Father     No Known Problems Sister     No Known Problems Sister     No Known Problems Sister     No Known Problems Brother      OB History    No obstetric history on file.       Allergies   Allergen Reactions    Penicillins Anaphylaxis and Unknown - High Severity     Social History     Socioeconomic History    Marital status:    Tobacco Use    Smoking status: Former     Types: Cigarettes     Quit date: 2022     Years since quittin.2    Smokeless tobacco: Never   Vaping Use    Vaping Use: Never used   Substance and Sexual Activity    Alcohol use: Not Currently    Drug use: Yes     Types: Marijuana    Sexual activity: Defer     Current Medications:  Prior to Admission medications    Medication Sig Start Date End Date Taking? Authorizing Provider   famotidine (Pepcid) 40 MG tablet Take 1 tablet by mouth 2 (Two) Times a Day As Needed for Heartburn. 23  Yes Dane Leyva PA-C   hydrOXYzine (ATARAX) 25 MG tablet Take 1 tablet by mouth Every 6 (Six) Hours. 23  Yes Provider, MD Hiren   linaclotide (Linzess) 72 MCG capsule capsule Take 1 capsule by mouth Every Morning Before Breakfast. 6/15/23  Yes Dane Leyva PA-C   ondansetron ODT (ZOFRAN-ODT) 4 MG disintegrating tablet Place 1 tablet on the tongue Every 8 (Eight) Hours As Needed for Nausea or Vomiting. 2/10/23  Yes Dane Leyva PA-C     Review of Systems  Review of Systems       Objective    /68   Pulse 68   Ht 152.4 cm (60\")   Wt 87.3 kg (192 lb 6.4 oz)   LMP 2023   BMI 37.58 kg/mý   Physical Exam  Vitals and nursing note reviewed.   Constitutional:       General: She is not in acute distress.     Appearance: She is well-developed. She is obese.      Comments: ELMO STOVERT:      Head: Normocephalic and atraumatic.   Eyes:      Pupils: Pupils are equal, round, and reactive to light.   Cardiovascular:      Rate and Rhythm: Normal rate and regular rhythm.      " Heart sounds: Normal heart sounds.   Pulmonary:      Effort: Pulmonary effort is normal.      Breath sounds: Normal breath sounds.   Abdominal:      General: Bowel sounds are normal. There is no distension or abdominal bruit.      Palpations: Abdomen is soft. Abdomen is not rigid. There is no shifting dullness or mass.      Tenderness: There is abdominal tenderness. There is no guarding or rebound.      Hernia: No hernia is present. There is no hernia in the ventral area.   Musculoskeletal:         General: Normal range of motion.      Cervical back: Normal range of motion.   Skin:     General: Skin is warm and dry.   Neurological:      Mental Status: She is alert and oriented to person, place, and time.   Psychiatric:         Behavior: Behavior normal.         Thought Content: Thought content normal.         Judgment: Judgment normal.     Assessment & Plan      1. Irritable bowel syndrome with constipation    2. Gastroesophageal reflux disease with esophagitis without hemorrhage    3. Constipation, unspecified constipation type    .   Diagnoses and all orders for this visit:    1. Irritable bowel syndrome with constipation (Primary)    2. Gastroesophageal reflux disease with esophagitis without hemorrhage    3. Constipation, unspecified constipation type        Orders placed during this encounter include:  No orders of the defined types were placed in this encounter.      Medications prescribed:  No orders of the defined types were placed in this encounter.      Requested Prescriptions      No prescriptions requested or ordered in this encounter       Review and/or summary of lab tests, radiology, procedures, medications. Review and summary of old records and obtaining of history. The risks and benefits of my recommendations, as well as other treatment options were discussed with the patient today. Questions were answered.    Follow-up: Return in about 3 months (around 10/27/2023), or if symptoms worsen or fail to  improve.     * Surgery not found *      This document has been electronically signed by Dane Leyva PA-C on August 10, 2023 18:31 CDT      Results for orders placed or performed in visit on 23   LIQUID-BASED PAP SMEAR WITH HPV GENOTYPING IF ASCUS ([facility])    Specimen: Tissue   Result Value Ref Range    Reference Lab Report       [facility]  [address] [place] [address]  Phone: [phone] or [phone]  Fax: [phone]  Charles Ramos M.D., Medical Director    PATIENT NAME                                     LABORATORY NO.  [id number]   LESA SO.                            [id number]  [id number]                                 AGE                    SEX   SSN              CLIENT REF #  [facility]                2000      F     [id number]      [id number]    [facility]                               REQUESTING M.D.           ATTENDING M.D.         COPY TO[address] [facility], [place] [facility], 2ND FLOOR                  DATE COLLECTED            DATE RECEIVED          [place] [address]                     2023  ThinPrep Pap with Cytyc Imaging    DIAGNOSIS:  Negative for intraepithelial lesion or  malignancy    Multiple factors can influence accuracy of Pap tests; therefore, screening at  regular intervals is necessary for early cancer detection.      SPECIMEN ADEQUACY:  SATISFACTORY FOR EVALUATION  Transformation zone is present.  SOURCE OF SPECIMEN:       CERVICAL  SLIDES:  1  CLINICAL HISTORY:  Encounter for Papanicolaou smear for cervical cancer screening      CYTOTECHNOLOGIST:             TING VINES (ASCP)    CPT CODES:  91736     HIV-1 / O / 2 Ag / Antibody 4th Generation    Specimen: Blood   Result Value Ref Range    HIV-1/ HIV-2 Non-Reactive Non-Reactive   Chlamydia trachomatis, Neisseria  gonorrhoeae, Trichomonas vaginalis, PCR - Swab, Cervix    Specimen: Cervix; Swab   Result Value Ref Range    Chlamydia DNA by PCR Negative Negative    Neisseria gonorrhoeae by PCR Negative Negative    Trichomonas vaginalis PCR Negative Negative, Invalid   HSV 1 & 2 - Specific Antibody, IgG    Specimen: Blood   Result Value Ref Range    HSV 1 IgG, Type Specific 1.58 (H) 0.00 - 0.90 index    HSV 2 IgG <0.91 0.00 - 0.90 index   Hepatitis C Antibody    Specimen: Blood   Result Value Ref Range    Hepatitis C Ab Non-Reactive Non-Reactive   RPR    Specimen: Blood   Result Value Ref Range    RPR Non-Reactive Non-Reactive   Hepatitis B Surface Antigen    Specimen: Blood   Result Value Ref Range    Hepatitis B Surface Ag Non-Reactive Non-Reactive   Results for orders placed or performed in visit on 23   H. Pylori Antigen, Stool - Stool, Per Rectum    Specimen: Per Rectum; Stool   Result Value Ref Range    H pylori Ag, Stl Negative Negative   Results for orders placed or performed during the hospital encounter of 23   TISSUE EXAM, [facility] ([facility])    Specimen: A: Small Intestine, Duodenum; Tissue    B: Gastric, Antrum; Tissue    C: Esophagus; Tissue    D: Small Intestine, Ileum; Tissue    E: Large Intestine; Tissue   Result Value Ref Range    Reference Lab Report       [facility]  [address] [place] [address]  Phone: [phone] or [phone]  Fax: [phone]  Charles Ramos M.D., Medical Director    PATIENT NAME                           LABORATORY NO.  LESA SALCEDO.                  [id number]  [id number]                         AGE              SEX  SSN           CLIENT REF #  [facility]           22      2000  F    [id number]   [id number]    [place]                       REQUESTING M.D.     ATTENDING M.D.     COPY TO[address] [address]           "JESSICA  DATE COLLECTED      DATE RECEIVED      DATE REPORTED  01/24/2023 01/25/2023 01/30/2023    DIAGNOSIS:  A.   SMALL BOWEL, BIOPSY:  No significant histologic abnormality  B.   ANTRUM, BIOPSY:  Active chronic gastritis  Positive for Helicobacter pylori  C.   GE JUNCTION:  Reactive gastric and squamous mucosa  D.    TERMINAL ILEUM BIOPSY:  No significant histologic abnormality  E.   COLON, BIOPSY:  No significant histologic abnormality    JBS/sm    COMMENT:  I ordered H. pylori immunohistochemical (IHC) stain on block  B1 because a typical inflammatory infiltrate was present, and organisms are not  seen on H&E staining.  H. pylori IHC stain is positive for Helicobacter pylori.  Control tissue stains as expected.    CLINICAL HISTORY:  Hemorrhage of anus and rectum, lower abdominal pain, change in bowel habits    SPECIMENS RECEIVED:  A.  SMALL BOWEL, BIOPSY  B.  ANTRUM, BIOPSY  C.  GE JUNCTION  D.  TERMINAL ILEUM BIOPSY  E.  COLON, BIOPSY    MICROSCOPIC DESCRIPTION:  Tissue blocks are prepared and slides are examined microscopically on all  specimens. See diagnosis for details.    The internal and external (both positive and negative) controls reacted  appropriately. Some of our immunohistochemical and in situ hybridization  studies are performed as analyte specific reagents. The following  statement  applies to those tests: This test was developed, and its performance  characteristics determined by [facility]. It has not been  cleared or approved by the [place] Food and Drug Administration. However, the  FDA has determined that approval and clearance are not necessary.    Professional interpretation rendered by Cornelio Cage M.D. at [facility],  [facility], [address] [place], [place] [address].    GROSS DESCRIPTION:  A.  Specimen is received in 1 formalin filled container labeled \"small bowel  biopsy\" and consists of 3 portions of tan soft tissue measuring 0.4 x 0.4 x  0.2 cm " "in aggregate.  Submitted entirely in 1 cassette.  MTH  B.  Specimen is received in 1 formalin filled container labeled \"antrum  biopsy\" and consists of 2 portions of tan soft tissue measuring 0.4 x 0.4 x  0.2 cm in aggregate.  Submitted entirely in 1 cassette.  C.  Specimen is received in 1 formalin filled container labeled \"EG junction  biopsy\" and consists of a single portion of gray  soft tissue measuring 0.3 x  0.2 x 0.2 cm.  Submitted entirely in 1 cassette.  D.  Specimen is received in 1 formalin filled container labeled \"TI biopsy\" and  consists of 2 portions of tan soft tissue measuring 0.5 x 0.3 x 0.2 cm.  Submitted entirely in one cassette the smallest portion may not survive  processing.  E.  Specimen is received in 1 formalin filled container labeled \"colonic  mucosa biopsy\" and consists of 2 portions of tan soft tissue measuring 0.3  x 0.3 x 0.2 cm in aggregate.  Submitted entirely in 1 cassette.    REVIEWED, DIAGNOSED AND ELECTRONICALLY  SIGNED BY:    Cornelio Cage M.D.  CPT CODES:  88305x5, 86552     Results for orders placed or performed in visit on 12/13/22   CBC Auto Differential    Specimen: Blood   Result Value Ref Range    WBC 5.80 3.40 - 10.80 10*3/mm3    RBC 4.62 3.77 - 5.28 10*6/mm3    Hemoglobin 13.2 12.0 - 15.9 g/dL    Hematocrit 38.5 34.0 - 46.6 %    MCV 83.3 79.0 - 97.0 fL    MCH 28.6 26.6 - 33.0 pg    MCHC 34.3 31.5 - 35.7 g/dL    RDW 13.5 12.3 - 15.4 %    RDW-SD 41.3 37.0 - 54.0 fl    MPV 10.6 6.0 - 12.0 fL    Platelets 378 140 - 450 10*3/mm3    Neutrophil % 55.5 42.7 - 76.0 %    Lymphocyte % 35.5 19.6 - 45.3 %    Monocyte % 6.2 5.0 - 12.0 %    Eosinophil % 1.9 0.3 - 6.2 %    Basophil % 0.7 0.0 - 1.5 %    Immature Grans % 0.2 0.0 - 0.5 %    Neutrophils, Absolute 3.22 1.70 - 7.00 10*3/mm3    Lymphocytes, Absolute 2.06 0.70 - 3.10 10*3/mm3    Monocytes, Absolute 0.36 0.10 - 0.90 10*3/mm3    Eosinophils, Absolute 0.11 0.00 - 0.40 10*3/mm3    Basophils, Absolute 0.04 0.00 - 0.20 " 10*3/mm3    Immature Grans, Absolute 0.01 0.00 - 0.05 10*3/mm3    nRBC 0.2 0.0 - 0.2 /100 WBC   Hepatitis C Antibody    Specimen: Blood   Result Value Ref Range    Hepatitis C Ab Non-Reactive Non-Reactive   TSH    Specimen: Blood   Result Value Ref Range    TSH 0.811 0.270 - 4.200 uIU/mL   Hemoglobin A1c    Specimen: Blood   Result Value Ref Range    Hemoglobin A1C 5.00 4.80 - 5.60 %   Vitamin B12    Specimen: Blood   Result Value Ref Range    Vitamin B-12 257 211 - 946 pg/mL   Lipid Panel    Specimen: Blood   Result Value Ref Range    Total Cholesterol 171 0 - 200 mg/dL    Triglycerides 32 0 - 150 mg/dL    HDL Cholesterol 60 40 - 60 mg/dL    LDL Cholesterol  104 (H) 0 - 100 mg/dL    VLDL Cholesterol 7 5 - 40 mg/dL    LDL/HDL Ratio 1.74    Comprehensive Metabolic Panel    Specimen: Blood   Result Value Ref Range    Glucose 95 65 - 99 mg/dL    BUN 12 6 - 20 mg/dL    Creatinine 0.73 0.57 - 1.00 mg/dL    Sodium 137 136 - 145 mmol/L    Potassium 4.8 3.5 - 5.2 mmol/L    Chloride 104 98 - 107 mmol/L    CO2 27.1 22.0 - 29.0 mmol/L    Calcium 9.2 8.6 - 10.5 mg/dL    Total Protein 7.1 6.0 - 8.5 g/dL    Albumin 4.60 3.50 - 5.20 g/dL    ALT (SGPT) 6 1 - 33 U/L    AST (SGOT) 15 1 - 32 U/L    Alkaline Phosphatase 57 39 - 117 U/L    Total Bilirubin 0.2 0.0 - 1.2 mg/dL    Globulin 2.5 gm/dL    A/G Ratio 1.8 g/dL    BUN/Creatinine Ratio 16.4 7.0 - 25.0    Anion Gap 5.9 5.0 - 15.0 mmol/L    eGFR 119.4 >60.0 mL/min/1.73   Results for orders placed or performed during the hospital encounter of 08/17/22   COVID-19 and FLU A/B PCR - Swab, Nasopharynx    Specimen: Nasopharynx; Swab   Result Value Ref Range    COVID19 Detected (C) Not Detected - Ref. Range    Influenza A PCR Not Detected Not Detected    Influenza B PCR Not Detected Not Detected   TISSUE EXAM, [facility] ([facility])    Specimen: Large Intestine, Appendix; Tissue   Result Value Ref Range    Reference Lab Report       [facility]  [address] [place] [address]  Phone: [phone] or [phone]  Fax: [phone]  Charles Ramos M.D., Medical Director    PATIENT NAME                           LABORATORY NO.  LESA SALCEDO.                  [id number]  [id number]                         AGE              SEX  SSN           CLIENT REF #  [facility]           21      2000  F    [id number]   [id number]    [place]                       REQUESTING M.D.     ATTENDING M.D.     COPY TO.  [address] [place] [address]                 [place] [address]             DATE COLLECTED      DATE RECEIVED      DATE REPORTED  2022    DIAGNOSIS:  APPENDIX:  Acute appendicitis    JBS/sdl    CLINICAL HISTORY:  Epigastric pain    SPECIMENS RECEIVED:  APPENDIX    MICROSCOPIC DESCRIPTION:  Tissue blocks are prepared and slides are examined microscopically on all  specimens. See diagnosis for  details.    Professional interpretation rendered by Cornelio Cage M.D. at [facility],  [employer], [address] [place] [address].    GROSS DESCRIPTION:  Received in formalin labeled with \"large intestine, appendix\" is a 6.4 cm in  length, 4.0 cm in diameter vermiform appendix with an attached yellow  lobulated, intact, up to 1.4 cm mesoappendix.  The serosal surface is tan-red,  focally hemorrhagic, smooth and glistening with a moderate amount of tan-  yellow exudate located on the entire length of the appendix.  The staple proximal  margin is removed and inked blue.  The specimen is serially sectioned to reveal  a 0.4 to 0.6 cm lumen with an up to 0.3 cm wall thickness.  A fecalith is not  appreciated.  No masses or lesions are identified.  Representative sections are  submitted in A1.  [facility]    Section code:  A1: Proximal margin, en face, half distal tip, body    REVIEWED, DIAGNOSED AND ELECTRONICALLY  SIGNED BY:    Cornelio Cage M.D.  CPT CODES:  96165     Urine Drug Screen " - Urine, Clean Catch    Specimen: Urine, Clean Catch   Result Value Ref Range    THC, Screen, Urine Positive (A) Negative    Phencyclidine (PCP), Urine Negative Negative    Cocaine Screen, Urine Negative Negative    Methamphetamine, Ur Negative Negative    Opiate Screen Positive (A) Negative    Amphetamine Screen, Urine Negative Negative    Benzodiazepine Screen, Urine Negative Negative    Tricyclic Antidepressants Screen Negative Negative    Methadone Screen, Urine Negative Negative    Barbiturates Screen, Urine Negative Negative    Oxycodone Screen, Urine Negative Negative    Propoxyphene Screen Negative Negative    Buprenorphine, Screen, Urine Negative Negative   Pregnancy, Urine - Urine, Clean Catch    Specimen: Urine, Clean Catch   Result Value Ref Range    HCG, Urine QL Negative Negative   Results for orders placed or performed during the hospital encounter of 08/16/22   Gold Top - SST   Result Value Ref Range    Extra Tube Hold for add-ons.    Green Top (Gel)   Result Value Ref Range    Extra Tube Hold for add-ons.    Urinalysis, Microscopic Only - Urine, Clean Catch    Specimen: Urine, Clean Catch   Result Value Ref Range    RBC, UA None Seen None Seen /HPF    WBC, UA 13-20 (A) None Seen, 0-2, 3-5 /HPF    Bacteria, UA 3+ (A) None Seen /HPF    Squamous Epithelial Cells, UA 13-20 (A) None Seen, 0-2 /HPF    Hyaline Casts, UA None Seen None Seen /LPF    Methodology Manual Light Microscopy    Urinalysis With Microscopic If Indicated (No Culture) - Urine, Clean Catch    Specimen: Urine, Clean Catch   Result Value Ref Range    Color, UA Yellow Yellow, Straw, Dark Yellow, Yudith    Appearance, UA Cloudy (A) Clear    pH, UA 6.5 5.0 - 9.0    Specific [place], UA 1.026 1.003 - 1.030    Glucose, UA Negative Negative    Ketones, UA 80 mg/dL (3+) (A) Negative    Bilirubin, UA Negative Negative    Blood, UA Negative Negative    Protein, UA Trace (A) Negative    Leuk Esterase, UA Trace (A) Negative    Nitrite, UA Negative  Negative    Urobilinogen, UA 1.0 E.U./dL 0.2 - 1.0 E.U./dL     *Note: Due to a large number of results and/or encounters for the requested time period, some results have not been displayed. A complete set of results can be found in Results Review.

## 2023-07-28 RX ORDER — FAMOTIDINE 40 MG/1
TABLET, FILM COATED ORAL
Qty: 60 TABLET | Refills: 3 | Status: SHIPPED | OUTPATIENT
Start: 2023-07-28

## (undated) DEVICE — SOL IRR NACL 0.9PCT BT 1000ML

## (undated) DEVICE — STERILE POLYISOPRENE POWDER-FREE SURGICAL GLOVES WITH EMOLLIENT COATING: Brand: PROTEXIS

## (undated) DEVICE — ENDOPOUCH RETRIEVER SPECIMEN RETRIEVAL BAGS: Brand: ENDOPOUCH RETRIEVER

## (undated) DEVICE — ECHELON FLEX  POWERED VASCULAR STAPLER WITH ADVANCED PLACEMENT TIP, 35MM: Brand: ECHELON FLEX

## (undated) DEVICE — SUT VIC 0/0 UR6 27IN DYED J603H

## (undated) DEVICE — TRAP FLD MINIVAC MEGADYNE 100ML

## (undated) DEVICE — SUT MONOCRYL 4/0 PS2 27IN Y426H ETY426H

## (undated) DEVICE — ENDOPATH XCEL BLUNT TIP TROCARS WITH SMOOTH SLEEVES: Brand: ENDOPATH XCEL

## (undated) DEVICE — ENDOPATH XCEL BLADELESS TROCARS WITH STABILITY SLEEVES: Brand: ENDOPATH XCEL

## (undated) DEVICE — LAPAROSCOPIC SMOKE FILTRATION SYSTEM: Brand: PALL LAPAROSHIELD® PLUS LAPAROSCOPIC SMOKE FILTRATION SYSTEM

## (undated) DEVICE — GLV SURG SIGNATURE ESSENTIAL PF LTX SZ7.5

## (undated) DEVICE — ANTIBACTERIAL UNDYED BRAIDED (POLYGLACTIN 910), SYNTHETIC ABSORBABLE SUTURE: Brand: COATED VICRYL

## (undated) DEVICE — ENDOPATH XCEL DILATING TIP TROCARS WITH STABILITY SLEEVES: Brand: ENDOPATH XCEL

## (undated) DEVICE — VISUALIZATION SYSTEM: Brand: CLEARIFY

## (undated) DEVICE — MONOPOLAR METZENBAUM SCISSOR TIP, DISPOSABLE: Brand: MONOPOLAR METZENBAUM SCISSOR TIP, DISPOSABLE

## (undated) DEVICE — HARMONIC ACE +7 LAPAROSCOPIC SHEARS ADVANCED HEMOSTASIS 5MM DIAMETER 36CM SHAFT LENGTH  FOR USE WITH GRAY HAND PIECE ONLY: Brand: HARMONIC ACE

## (undated) DEVICE — UNDYED BRAIDED (POLYGLACTIN 910), SYNTHETIC ABSORBABLE SUTURE: Brand: COATED VICRYL

## (undated) DEVICE — PATIENT RETURN ELECTRODE, SINGLE-USE, CONTACT QUALITY MONITORING, ADULT, WITH 9FT CORD, FOR PATIENTS WEIGING OVER 33LBS. (15KG): Brand: MEGADYNE

## (undated) DEVICE — BITEBLOCK ENDO W/STRAP 60F A/ LF DISP

## (undated) DEVICE — PK LAP CHOLE LF 60

## (undated) DEVICE — SINGLE-USE BIOPSY FORCEPS: Brand: RADIAL JAW 4

## (undated) DEVICE — ADHS SKIN PREMIERPRO EXOFIN TOPICAL HI/VISC .5ML

## (undated) DEVICE — GLV SURG TRIUMPH PF LTX 6.5 STRL

## (undated) DEVICE — SYR LL TP 10ML STRL